# Patient Record
Sex: MALE | Race: WHITE | Employment: OTHER | URBAN - METROPOLITAN AREA
[De-identification: names, ages, dates, MRNs, and addresses within clinical notes are randomized per-mention and may not be internally consistent; named-entity substitution may affect disease eponyms.]

---

## 2017-06-13 ENCOUNTER — OFFICE VISIT (OUTPATIENT)
Dept: FAMILY MEDICINE CLINIC | Age: 57
End: 2017-06-13

## 2017-06-13 VITALS
SYSTOLIC BLOOD PRESSURE: 142 MMHG | RESPIRATION RATE: 16 BRPM | DIASTOLIC BLOOD PRESSURE: 78 MMHG | HEART RATE: 64 BPM | BODY MASS INDEX: 23.82 KG/M2 | TEMPERATURE: 98.3 F | HEIGHT: 74 IN | WEIGHT: 185.6 LBS

## 2017-06-13 DIAGNOSIS — R31.29 MICROSCOPIC HEMATURIA: ICD-10-CM

## 2017-06-13 DIAGNOSIS — N23 RENAL COLIC ON LEFT SIDE: Primary | ICD-10-CM

## 2017-06-13 DIAGNOSIS — R10.9 FLANK PAIN: ICD-10-CM

## 2017-06-13 LAB
BILIRUBIN, POC: ABNORMAL
BLOOD URINE, POC: ABNORMAL
CLARITY, POC: CLEAR
COLOR, POC: ABNORMAL
GLUCOSE URINE, POC: ABNORMAL
KETONES, POC: ABNORMAL
LEUKOCYTE EST, POC: ABNORMAL
NITRITE, POC: ABNORMAL
PH, POC: 5.5
PROTEIN, POC: ABNORMAL
SPECIFIC GRAVITY, POC: 1.03
UROBILINOGEN, POC: 1

## 2017-06-13 PROCEDURE — 99213 OFFICE O/P EST LOW 20 MIN: CPT | Performed by: FAMILY MEDICINE

## 2017-06-13 PROCEDURE — 81003 URINALYSIS AUTO W/O SCOPE: CPT | Performed by: FAMILY MEDICINE

## 2017-06-13 RX ORDER — TAMSULOSIN HYDROCHLORIDE 0.4 MG/1
0.4 CAPSULE ORAL DAILY
Qty: 14 CAPSULE | Refills: 0 | Status: SHIPPED | OUTPATIENT
Start: 2017-06-13 | End: 2018-04-05

## 2017-06-13 RX ORDER — HYDROCODONE BITARTRATE AND ACETAMINOPHEN 5; 325 MG/1; MG/1
1 TABLET ORAL EVERY 6 HOURS PRN
Qty: 30 TABLET | Refills: 0 | Status: SHIPPED | OUTPATIENT
Start: 2017-06-13 | End: 2018-04-05

## 2017-06-13 ASSESSMENT — PATIENT HEALTH QUESTIONNAIRE - PHQ9
1. LITTLE INTEREST OR PLEASURE IN DOING THINGS: 0
SUM OF ALL RESPONSES TO PHQ9 QUESTIONS 1 & 2: 0
SUM OF ALL RESPONSES TO PHQ QUESTIONS 1-9: 0
2. FEELING DOWN, DEPRESSED OR HOPELESS: 0

## 2017-06-14 ASSESSMENT — ENCOUNTER SYMPTOMS
CONSTIPATION: 0
DIARRHEA: 0
SINUS PRESSURE: 0
RHINORRHEA: 0
NAUSEA: 0
SORE THROAT: 0
SHORTNESS OF BREATH: 0
ABDOMINAL PAIN: 0
ABDOMINAL DISTENTION: 0
EYE PAIN: 0
COUGH: 0

## 2017-06-19 ENCOUNTER — TELEPHONE (OUTPATIENT)
Dept: FAMILY MEDICINE CLINIC | Age: 57
End: 2017-06-19

## 2017-06-19 DIAGNOSIS — E78.5 HYPERLIPIDEMIA, UNSPECIFIED HYPERLIPIDEMIA TYPE: Primary | Chronic | ICD-10-CM

## 2017-06-19 DIAGNOSIS — Z12.5 PROSTATE CANCER SCREENING: ICD-10-CM

## 2017-07-15 LAB
CHOLESTEROL, TOTAL: 233 MG/DL
CHOLESTEROL/HDL RATIO: NORMAL
HDLC SERPL-MCNC: 62 MG/DL (ref 35–70)
LDL CHOLESTEROL CALCULATED: 152 MG/DL (ref 0–160)
PROSTATE SPECIFIC ANTIGEN: 0.6 NG/ML
TRIGL SERPL-MCNC: 93 MG/DL
VLDLC SERPL CALC-MCNC: 19 MG/DL

## 2017-07-31 ENCOUNTER — TELEPHONE (OUTPATIENT)
Dept: FAMILY MEDICINE CLINIC | Age: 57
End: 2017-07-31

## 2017-07-31 DIAGNOSIS — E78.5 HYPERLIPIDEMIA, UNSPECIFIED HYPERLIPIDEMIA TYPE: Primary | Chronic | ICD-10-CM

## 2017-08-29 ENCOUNTER — NURSE TRIAGE (OUTPATIENT)
Dept: ADMINISTRATIVE | Age: 57
End: 2017-08-29

## 2017-10-11 LAB
ALBUMIN SERPL-MCNC: 3.7 G/DL
ALP BLD-CCNC: 63 U/L
ALT SERPL-CCNC: 26 U/L
ANION GAP SERPL CALCULATED.3IONS-SCNC: 5 MMOL/L
AST SERPL-CCNC: 15 U/L
AVERAGE GLUCOSE: NORMAL
BILIRUB SERPL-MCNC: 0.8 MG/DL (ref 0.1–1.4)
BUN BLDV-MCNC: 12 MG/DL
CALCIUM SERPL-MCNC: 8.9 MG/DL
CHLORIDE BLD-SCNC: 104 MMOL/L
CHOLESTEROL, TOTAL: 203 MG/DL
CHOLESTEROL/HDL RATIO: NORMAL
CO2: 29 MMOL/L
CREAT SERPL-MCNC: 0.9 MG/DL
GFR CALCULATED: NORMAL
GLUCOSE BLD-MCNC: 83 MG/DL
HBA1C MFR BLD: 5.8 %
HDLC SERPL-MCNC: 61 MG/DL (ref 35–70)
LDL CHOLESTEROL CALCULATED: 122 MG/DL (ref 0–160)
POTASSIUM SERPL-SCNC: 4.6 MMOL/L
SODIUM BLD-SCNC: 138 MMOL/L
TOTAL PROTEIN: 7.1
TRIGL SERPL-MCNC: 102 MG/DL
VLDLC SERPL CALC-MCNC: NORMAL MG/DL

## 2017-11-10 LAB
CHOLESTEROL, TOTAL: 196 MG/DL
CHOLESTEROL/HDL RATIO: NORMAL
HDLC SERPL-MCNC: 59 MG/DL (ref 35–70)
LDL CHOLESTEROL CALCULATED: 118 MG/DL (ref 0–160)
PSA, ULTRASENSITIVE: 0.5
TRIGL SERPL-MCNC: 88 MG/DL
VLDLC SERPL CALC-MCNC: NORMAL MG/DL

## 2018-02-04 LAB
ALBUMIN SERPL-MCNC: NORMAL G/DL
ALP BLD-CCNC: NORMAL U/L
ALT SERPL-CCNC: NORMAL U/L
ANION GAP SERPL CALCULATED.3IONS-SCNC: 9 MMOL/L
AST SERPL-CCNC: NORMAL U/L
BASOPHILS ABSOLUTE: 0.03 /ΜL
BASOPHILS RELATIVE PERCENT: 0.5 %
BILIRUB SERPL-MCNC: NORMAL MG/DL (ref 0.1–1.4)
BILIRUBIN URINE: NORMAL MG/DL
BLOOD, URINE: NORMAL
BUN BLDV-MCNC: 14 MG/DL
CALCIUM SERPL-MCNC: 8.9 MG/DL
CHLORIDE BLD-SCNC: 107 MMOL/L
CLARITY: CLEAR
CO2: 23 MMOL/L
COLOR: YELLOW
CREAT SERPL-MCNC: 0.9 MG/DL
EOSINOPHILS ABSOLUTE: 0.08 /ΜL
EOSINOPHILS RELATIVE PERCENT: 1.5 %
GFR CALCULATED: NORMAL
GLUCOSE BLD-MCNC: 111 MG/DL
GLUCOSE URINE: NEGATIVE
HCT VFR BLD CALC: 42.9 % (ref 41–53)
HEMOGLOBIN: 14.2 G/DL (ref 13.5–17.5)
KETONES, URINE: NEGATIVE
LEUKOCYTE ESTERASE, URINE: NORMAL
LYMPHOCYTES ABSOLUTE: 1.07 /ΜL
LYMPHOCYTES RELATIVE PERCENT: 19.6 %
MCH RBC QN AUTO: 27.5 PG
MCHC RBC AUTO-ENTMCNC: 33.1 G/DL
MCV RBC AUTO: 83 FL
MONOCYTES ABSOLUTE: 0.52 /ΜL
MONOCYTES RELATIVE PERCENT: 9.5 %
NEUTROPHILS ABSOLUTE: NORMAL /ΜL
NEUTROPHILS RELATIVE PERCENT: NORMAL %
NITRITE, URINE: NEGATIVE
PDW BLD-RTO: 13.4 %
PH UA: 5 (ref 4.5–8)
PLATELET # BLD: 302 K/ΜL
PMV BLD AUTO: 9.9 FL
POTASSIUM SERPL-SCNC: 4.2 MMOL/L
PROTEIN UA: NEGATIVE
RBC # BLD: 5.17 10^6/ΜL
SODIUM BLD-SCNC: 139 MMOL/L
SPECIFIC GRAVITY UA: 1.02 (ref 1–1.03)
TOTAL PROTEIN: NORMAL
UROBILINOGEN, URINE: NORMAL
WBC # BLD: 5.47 10^3/ML

## 2018-02-12 DIAGNOSIS — N20.0 KIDNEY STONE: Primary | ICD-10-CM

## 2018-02-12 NOTE — PROGRESS NOTES
Pt has appt sat 2-17-18 for gross hematuria. Hx of smoking. Pt brought in a passed stone today. Stone sent for anaclisis but left appt on due to history of smoking.

## 2018-02-17 ENCOUNTER — OFFICE VISIT (OUTPATIENT)
Dept: UROLOGY | Age: 58
End: 2018-02-17
Payer: OTHER GOVERNMENT

## 2018-02-17 ENCOUNTER — TELEPHONE (OUTPATIENT)
Dept: UROLOGY | Age: 58
End: 2018-02-17

## 2018-02-17 VITALS
HEIGHT: 74 IN | BODY MASS INDEX: 25.27 KG/M2 | DIASTOLIC BLOOD PRESSURE: 82 MMHG | SYSTOLIC BLOOD PRESSURE: 118 MMHG | WEIGHT: 196.9 LBS

## 2018-02-17 DIAGNOSIS — R31.0 GROSS HEMATURIA: Primary | ICD-10-CM

## 2018-02-17 LAB
BILIRUBIN URINE: NEGATIVE
BLOOD URINE, POC: NEGATIVE
CHARACTER, URINE: CLEAR
COLOR, URINE: YELLOW
GLUCOSE URINE: NEGATIVE MG/DL
KETONES, URINE: NEGATIVE
LEUKOCYTE CLUMPS, URINE: NEGATIVE
NITRITE, URINE: NEGATIVE
PH, URINE: 7
POST VOID RESIDUAL (PVR): NORMAL ML
PROTEIN, URINE: NEGATIVE MG/DL
SPECIFIC GRAVITY, URINE: 1.01 (ref 1–1.03)
UROBILINOGEN, URINE: 0.2 EU/DL

## 2018-02-17 PROCEDURE — 51798 US URINE CAPACITY MEASURE: CPT | Performed by: UROLOGY

## 2018-02-17 PROCEDURE — 99214 OFFICE O/P EST MOD 30 MIN: CPT | Performed by: UROLOGY

## 2018-02-17 PROCEDURE — 81003 URINALYSIS AUTO W/O SCOPE: CPT | Performed by: UROLOGY

## 2018-02-17 PROCEDURE — 52000 CYSTOURETHROSCOPY: CPT | Performed by: UROLOGY

## 2018-02-17 ASSESSMENT — ENCOUNTER SYMPTOMS
CHEST TIGHTNESS: 0
CONSTIPATION: 0
DIARRHEA: 0
SHORTNESS OF BREATH: 0

## 2018-02-22 ENCOUNTER — TELEPHONE (OUTPATIENT)
Dept: UROLOGY | Age: 58
End: 2018-02-22

## 2018-02-26 LAB — STONE ANALYSIS: NORMAL

## 2018-03-13 ENCOUNTER — TELEPHONE (OUTPATIENT)
Dept: UROLOGY | Age: 58
End: 2018-03-13

## 2018-04-05 ENCOUNTER — OFFICE VISIT (OUTPATIENT)
Dept: UROLOGY | Age: 58
End: 2018-04-05
Payer: COMMERCIAL

## 2018-04-05 ENCOUNTER — TELEPHONE (OUTPATIENT)
Dept: UROLOGY | Age: 58
End: 2018-04-05

## 2018-04-05 VITALS
SYSTOLIC BLOOD PRESSURE: 122 MMHG | HEIGHT: 73 IN | WEIGHT: 201 LBS | DIASTOLIC BLOOD PRESSURE: 78 MMHG | BODY MASS INDEX: 26.64 KG/M2

## 2018-04-05 DIAGNOSIS — N20.0 NEPHROLITHIASIS: ICD-10-CM

## 2018-04-05 DIAGNOSIS — R39.198 DIFFICULTY URINATING: ICD-10-CM

## 2018-04-05 DIAGNOSIS — R31.9 HEMATURIA, UNSPECIFIED TYPE: Primary | ICD-10-CM

## 2018-04-05 DIAGNOSIS — N20.0 KIDNEY STONE: ICD-10-CM

## 2018-04-05 LAB
BILIRUBIN URINE: NEGATIVE
BLOOD URINE, POC: NEGATIVE
CHARACTER, URINE: CLEAR
COLOR, URINE: YELLOW
GLUCOSE URINE: NEGATIVE MG/DL
KETONES, URINE: NEGATIVE
LEUKOCYTE CLUMPS, URINE: NEGATIVE
NITRITE, URINE: NEGATIVE
PH, URINE: 6
PROTEIN, URINE: NEGATIVE MG/DL
SPECIFIC GRAVITY, URINE: >= 1.03 (ref 1–1.03)
UROBILINOGEN, URINE: 0.2 EU/DL

## 2018-04-05 PROCEDURE — 1036F TOBACCO NON-USER: CPT | Performed by: PHYSICIAN ASSISTANT

## 2018-04-05 PROCEDURE — 81003 URINALYSIS AUTO W/O SCOPE: CPT | Performed by: PHYSICIAN ASSISTANT

## 2018-04-05 PROCEDURE — G8427 DOCREV CUR MEDS BY ELIG CLIN: HCPCS | Performed by: PHYSICIAN ASSISTANT

## 2018-04-05 PROCEDURE — 99213 OFFICE O/P EST LOW 20 MIN: CPT | Performed by: PHYSICIAN ASSISTANT

## 2018-04-05 PROCEDURE — 3017F COLORECTAL CA SCREEN DOC REV: CPT | Performed by: PHYSICIAN ASSISTANT

## 2018-04-05 PROCEDURE — G8419 CALC BMI OUT NRM PARAM NOF/U: HCPCS | Performed by: PHYSICIAN ASSISTANT

## 2018-04-08 ASSESSMENT — LIFESTYLE VARIABLES: TOBACCO_USE: 1

## 2018-07-13 ENCOUNTER — HOSPITAL ENCOUNTER (EMERGENCY)
Age: 58
Discharge: HOME | End: 2018-07-13
Payer: COMMERCIAL

## 2018-07-13 VITALS
RESPIRATION RATE: 18 BRPM | BODY MASS INDEX: 24.45 KG/M2 | HEIGHT: 74 IN | WEIGHT: 190.5 LBS | DIASTOLIC BLOOD PRESSURE: 70 MMHG | SYSTOLIC BLOOD PRESSURE: 115 MMHG | TEMPERATURE: 98 F | OXYGEN SATURATION: 98 % | HEART RATE: 81 BPM

## 2018-07-13 DIAGNOSIS — S61.211A LACERATION OF LEFT INDEX FINGER WITHOUT FOREIGN BODY WITHOUT DAMAGE TO NAIL, INITIAL ENCOUNTER: Primary | ICD-10-CM

## 2018-07-13 PROCEDURE — 99214 OFFICE O/P EST MOD 30 MIN: CPT | Performed by: NURSE PRACTITIONER

## 2018-07-13 PROCEDURE — 12001 RPR S/N/AX/GEN/TRNK 2.5CM/<: CPT

## 2018-07-13 PROCEDURE — 99212 OFFICE O/P EST SF 10 MIN: CPT

## 2018-07-13 PROCEDURE — 12001 RPR S/N/AX/GEN/TRNK 2.5CM/<: CPT | Performed by: NURSE PRACTITIONER

## 2018-07-13 ASSESSMENT — ENCOUNTER SYMPTOMS
VOMITING: 0
DIARRHEA: 0
NAUSEA: 0
COUGH: 0
SHORTNESS OF BREATH: 0

## 2018-07-13 NOTE — ED TRIAGE NOTES
Pt to room 2 with c/o laceration to left index finger. Pt states was working in garage with a knife and accidentally cut self. Pt denies any pain at current.

## 2018-07-13 NOTE — ED NOTES
An After Visit Summary was printed, reviewed with pt and given to.   Left index finger dressed with dry gauze and wrapped with 1 inch coban prior to discharge,      Pj Carey LPN  40/09/11 [id number]

## 2018-07-13 NOTE — ED PROVIDER NOTES
use drugs. PHYSICAL EXAM     ED TRIAGE VITALS  BP: 115/70, Temp: 98 °F (36.7 °C), Pulse: 81, Resp: 18, SpO2: 98 %,Estimated body mass index is 24.46 kg/m² as calculated from the following:    Height as of this encounter: 6' 2\" (1.88 m). Weight as of this encounter: 190 lb 8 oz (86.4 kg). ,No LMP for male patient. Physical Exam   Constitutional: He is oriented to person, place, and time. He appears well-developed and well-nourished. He is cooperative. No distress. HENT:   Head: Normocephalic and atraumatic. Pulmonary/Chest: Effort normal. No respiratory distress. Musculoskeletal:        Left hand: He exhibits laceration. Hands:  Neurological: He is alert and oriented to person, place, and time. Skin: Skin is warm and dry. Psychiatric: He has a normal mood and affect. His speech is normal and behavior is normal. Thought content normal.   Nursing note and vitals reviewed. DIAGNOSTIC RESULTS   Labs:No results found for this visit on 07/13/18. IMAGING:    No orders to display         URGENT CARE COURSE:     Vitals:    07/13/18 1443 07/13/18 1447   BP:  115/70   Pulse:  81   Resp:  18   Temp:  98 °F (36.7 °C)   TempSrc:  Temporal   SpO2:  98%   Weight: 190 lb 8 oz (86.4 kg)    Height: 6' 2\" (1.88 m)        Medications - No data to display         PROCEDURES:  Laceration repair:  Left distal index finger laceration on the dorsal surface measuring 1 cm. Wound edges are clean. Wound cleansed with wound cleanser and Betadine. Wound anesthetized with approximately one mL of 1% lidocaine. Wound closed with 3 interrupted sutures using 5.0 Ethicon sutures. Wound cleansed after repair with wound wash. Dressing applied per nursing. Patient tolerated well. FINAL IMPRESSION      1.  Laceration of left index finger without foreign body without damage to nail, initial encounter          DISPOSITION/PLAN   DISPOSITION Decision To Discharge 07/13/2018 03:25:09 PM  Monitor incision for redness, drainage, pain, or fever. Keep clean and dry. May wash gently with soap and water after 24 hours. Sutures need to be removed in 7-10 days. You can make an appointment with your family doctor or return to the urgent care center for removal  Tylenol or Motrin for pain. Follow up with your PCP or return for any concerns   or go to the Emergency Department  Patient with a laceration to the left index finger to the pad distally. Wound repaired as described above. No antibiotics prescribed. Further instructions outlined above. All the patient's questions answered. The patient was discharged from the [facility] in good condition.     PATIENT REFERRED TO:  Obinna Jc MD  [address] [place] [address]  [phone]    In 1 week  For suture removal      DISCHARGE MEDICATIONS:  Current Discharge Medication List          Current Discharge Medication List          Current Discharge Medication List          Ophelia Bumpers Case, APRN - CNP    (Please note that portions of this note were completed with a voice recognition program.  Efforts were made to edit the dictations but occasionally words are mis-transcribed.)         Ophelia Bumpers Case, APRN - CNP  07/13/18 [id number]

## 2018-07-19 ENCOUNTER — HOSPITAL ENCOUNTER (EMERGENCY)
Age: 58
Discharge: HOME | End: 2018-07-19
Payer: COMMERCIAL

## 2018-07-19 VITALS
SYSTOLIC BLOOD PRESSURE: 121 MMHG | RESPIRATION RATE: 16 BRPM | OXYGEN SATURATION: 98 % | HEART RATE: 65 BPM | DIASTOLIC BLOOD PRESSURE: 67 MMHG | TEMPERATURE: 98.1 F

## 2018-07-19 DIAGNOSIS — Z48.02 ENCOUNTER FOR REMOVAL OF SUTURES: Primary | ICD-10-CM

## 2018-07-19 PROCEDURE — 2709999900 HC NON-CHARGEABLE SUPPLY

## 2018-07-19 PROCEDURE — 99024 POSTOP FOLLOW-UP VISIT: CPT | Performed by: NURSE PRACTITIONER

## 2018-07-19 PROCEDURE — 99211 OFF/OP EST MAY X REQ PHY/QHP: CPT

## 2018-07-19 ASSESSMENT — ENCOUNTER SYMPTOMS
VOMITING: 0
WHEEZING: 0
ABDOMINAL PAIN: 0
CHEST TIGHTNESS: 0
SHORTNESS OF BREATH: 0
NAUSEA: 0

## 2018-07-19 NOTE — ED TRIAGE NOTES
PT arrives ambulatory by self  to room with complaint of SUTURE REMOVAL symptoms started 7 days ago.

## 2018-07-19 NOTE — ED PROVIDER NOTES
[id number]  Urgent Care Encounter       CHIEF COMPLAINT       Chief Complaint   Patient presents with    Suture / Staple Removal       Nurses Notes reviewed and I agree except as noted in the HPI. HISTORY OF PRESENT ILLNESS   Jennifer Ordaz is a 61 y.o. male who presents For suture removal.  Patient reports that 6 days ago he was using a utility knife when he made a cut on his left index finger. He denies any issue such as fever, chills, discharge, redness or bleeding from the wound site. He does report that one of the sutures did untie and has loosened, however he had no other issues. The history is provided by the patient. REVIEW OF SYSTEMS     Review of Systems   Constitutional: Negative for chills and fatigue. Respiratory: Negative for chest tightness, shortness of breath and wheezing. Cardiovascular: Negative for chest pain. Gastrointestinal: Negative for abdominal pain, nausea and vomiting. Skin: Positive for wound. PAST MEDICAL HISTORY         Diagnosis Date    Dog bite of calf 3/10    right calf    Hyperlipidemia     Renal calculi     Umbilical hernia        SURGICAL HISTORY     Patient  has a past surgical history that includes back surgery (08/17/2015) and hernia repair (9535-1313). CURRENT MEDICATIONS       Discharge Medication List as of 7/19/2018  8:42 AM      CONTINUE these medications which have NOT CHANGED    Details   Multiple Vitamin (MULTI-VITAMIN DAILY PO) Take by mouthHistorical Med             ALLERGIES     Patient is has No Known Allergies. Patients   Immunization History   Administered Date(s) Administered    Tdap (Boostrix, Adacel) 01/01/2005       FAMILY HISTORY     Patient's family history includes Cancer in his father and mother. SOCIAL HISTORY     Patient  reports that he quit smoking about 26 years ago. He has quit using smokeless tobacco. He reports that he drinks alcohol.  He reports that he does not use

## 2018-09-04 ENCOUNTER — HOSPITAL ENCOUNTER (OUTPATIENT)
Dept: MRI IMAGING | Age: 58
Discharge: HOME | End: 2018-09-04
Payer: COMMERCIAL

## 2018-09-04 DIAGNOSIS — M17.12 ARTHRITIS OF KNEE, LEFT: ICD-10-CM

## 2018-09-04 PROCEDURE — 73721 MRI JNT OF LWR EXTRE W/O DYE: CPT

## 2018-10-18 ENCOUNTER — HOSPITAL ENCOUNTER (OUTPATIENT)
Dept: CT IMAGING | Age: 58
Discharge: HOME | End: 2018-10-18
Payer: COMMERCIAL

## 2018-10-18 DIAGNOSIS — M54.16 LUMBAR RADICULOPATHY: ICD-10-CM

## 2018-10-18 DIAGNOSIS — M48.061 SPINAL STENOSIS, LUMBAR REGION, WITHOUT NEUROGENIC CLAUDICATION: ICD-10-CM

## 2018-10-18 PROCEDURE — 72131 CT LUMBAR SPINE W/O DYE: CPT

## 2018-10-22 ENCOUNTER — HOSPITAL ENCOUNTER (OUTPATIENT)
Age: 58
Discharge: HOME | End: 2018-10-22
Payer: COMMERCIAL

## 2018-10-22 LAB
ANION GAP SERPL CALCULATED.3IONS-SCNC: 13 MEQ/L (ref 8–16)
APTT: 34.5 SECONDS (ref 22–38)
BASOPHILS # BLD: 0.9 %
BASOPHILS ABSOLUTE: 0 THOU/MM3 (ref 0–0.1)
BILIRUBIN URINE: NEGATIVE
BLOOD, URINE: NEGATIVE
BUN BLDV-MCNC: 13 MG/DL (ref 7–22)
CHARACTER, URINE: CLEAR
CHLORIDE BLD-SCNC: 102 MEQ/L (ref 98–111)
CO2: 26 MEQ/L (ref 23–33)
COLOR: YELLOW
CREAT SERPL-MCNC: 0.9 MG/DL (ref 0.4–1.2)
EKG ATRIAL RATE: 70 BPM
EKG P AXIS: 73 DEGREES
EKG P-R INTERVAL: 156 MS
EKG Q-T INTERVAL: 386 MS
EKG QRS DURATION: 94 MS
EKG QTC CALCULATION (BAZETT): 416 MS
EKG R AXIS: 61 DEGREES
EKG T AXIS: 38 DEGREES
EKG VENTRICULAR RATE: 70 BPM
EOSINOPHIL # BLD: 1.3 %
EOSINOPHILS ABSOLUTE: 0.1 THOU/MM3 (ref 0–0.4)
ERYTHROCYTE [DISTWIDTH] IN BLOOD BY AUTOMATED COUNT: 13 % (ref 11.5–14.5)
ERYTHROCYTE [DISTWIDTH] IN BLOOD BY AUTOMATED COUNT: 39.8 FL (ref 35–45)
GFR SERPL CREATININE-BSD FRML MDRD: 86 ML/MIN/1.73M2
GLUCOSE BLD-MCNC: 103 MG/DL (ref 70–108)
GLUCOSE URINE: NEGATIVE MG/DL
HCT VFR BLD CALC: 42 % (ref 42–52)
HEMOGLOBIN: 14 GM/DL (ref 14–18)
IMMATURE GRANS (ABS): 0.01 THOU/MM3 (ref 0–0.07)
IMMATURE GRANULOCYTES: 0.2 %
INR BLD: 0.95 (ref 0.85–1.13)
KETONES, URINE: ABNORMAL
LEUKOCYTE ESTERASE, URINE: NEGATIVE
LYMPHOCYTES # BLD: 24.2 %
LYMPHOCYTES ABSOLUTE: 1.1 THOU/MM3 (ref 1–4.8)
MCH RBC QN AUTO: 28 PG (ref 26–33)
MCHC RBC AUTO-ENTMCNC: 33.3 GM/DL (ref 32.2–35.5)
MCV RBC AUTO: 84 FL (ref 80–94)
MONOCYTES # BLD: 8.9 %
MONOCYTES ABSOLUTE: 0.4 THOU/MM3 (ref 0.4–1.3)
NITRITE, URINE: NEGATIVE
NUCLEATED RED BLOOD CELLS: 0 /100 WBC
PH UA: 6
PLATELET # BLD: 321 THOU/MM3 (ref 130–400)
PMV BLD AUTO: 10 FL (ref 9.4–12.4)
POTASSIUM SERPL-SCNC: 4.2 MEQ/L (ref 3.5–5.2)
PROTEIN UA: NEGATIVE
RBC # BLD: 5 MILL/MM3 (ref 4.7–6.1)
SEG NEUTROPHILS: 64.5 %
SEGMENTED NEUTROPHILS ABSOLUTE COUNT: 2.9 THOU/MM3 (ref 1.8–7.7)
SODIUM BLD-SCNC: 141 MEQ/L (ref 135–145)
SPECIFIC GRAVITY, URINE: 1.02 (ref 1–1.03)
UROBILINOGEN, URINE: 1 EU/DL
WBC # BLD: 4.5 THOU/MM3 (ref 4.8–10.8)

## 2018-10-22 PROCEDURE — 80051 ELECTROLYTE PANEL: CPT

## 2018-10-22 PROCEDURE — 93010 ELECTROCARDIOGRAM REPORT: CPT | Performed by: INTERNAL MEDICINE

## 2018-10-22 PROCEDURE — 84520 ASSAY OF UREA NITROGEN: CPT

## 2018-10-22 PROCEDURE — 85730 THROMBOPLASTIN TIME PARTIAL: CPT

## 2018-10-22 PROCEDURE — 85610 PROTHROMBIN TIME: CPT

## 2018-10-22 PROCEDURE — 82565 ASSAY OF CREATININE: CPT

## 2018-10-22 PROCEDURE — 36415 COLL VENOUS BLD VENIPUNCTURE: CPT

## 2018-10-22 PROCEDURE — 81003 URINALYSIS AUTO W/O SCOPE: CPT

## 2018-10-22 PROCEDURE — 82947 ASSAY GLUCOSE BLOOD QUANT: CPT

## 2018-10-22 PROCEDURE — 85025 COMPLETE CBC W/AUTO DIFF WBC: CPT

## 2018-10-22 PROCEDURE — 93005 ELECTROCARDIOGRAM TRACING: CPT | Performed by: ORTHOPAEDIC SURGERY

## 2018-10-29 ENCOUNTER — OFFICE VISIT (OUTPATIENT)
Dept: FAMILY MEDICINE CLINIC | Age: 58
End: 2018-10-29
Payer: COMMERCIAL

## 2018-10-29 VITALS
HEIGHT: 75 IN | SYSTOLIC BLOOD PRESSURE: 110 MMHG | RESPIRATION RATE: 16 BRPM | HEART RATE: 63 BPM | OXYGEN SATURATION: 99 % | TEMPERATURE: 98.4 F | WEIGHT: 192 LBS | BODY MASS INDEX: 23.87 KG/M2 | DIASTOLIC BLOOD PRESSURE: 70 MMHG

## 2018-10-29 DIAGNOSIS — Z01.818 PREOP EXAMINATION: ICD-10-CM

## 2018-10-29 DIAGNOSIS — M17.12 PRIMARY OSTEOARTHRITIS OF LEFT KNEE: Primary | ICD-10-CM

## 2018-10-29 DIAGNOSIS — E78.5 HYPERLIPIDEMIA, UNSPECIFIED HYPERLIPIDEMIA TYPE: Chronic | ICD-10-CM

## 2018-10-29 PROCEDURE — 99242 OFF/OP CONSLTJ NEW/EST SF 20: CPT | Performed by: FAMILY MEDICINE

## 2018-10-29 PROCEDURE — 3017F COLORECTAL CA SCREEN DOC REV: CPT | Performed by: FAMILY MEDICINE

## 2018-10-29 PROCEDURE — G8420 CALC BMI NORM PARAMETERS: HCPCS | Performed by: FAMILY MEDICINE

## 2018-10-29 PROCEDURE — G8484 FLU IMMUNIZE NO ADMIN: HCPCS | Performed by: FAMILY MEDICINE

## 2018-10-29 PROCEDURE — G8427 DOCREV CUR MEDS BY ELIG CLIN: HCPCS | Performed by: FAMILY MEDICINE

## 2018-10-29 ASSESSMENT — ENCOUNTER SYMPTOMS
BACK PAIN: 1
SHORTNESS OF BREATH: 0
ABDOMINAL PAIN: 0
RHINORRHEA: 0
WHEEZING: 0
DIARRHEA: 0
CONSTIPATION: 0
NAUSEA: 0
SORE THROAT: 0
COUGH: 0
VOMITING: 0

## 2018-10-29 ASSESSMENT — PATIENT HEALTH QUESTIONNAIRE - PHQ9
SUM OF ALL RESPONSES TO PHQ QUESTIONS 1-9: 0
1. LITTLE INTEREST OR PLEASURE IN DOING THINGS: 0
SUM OF ALL RESPONSES TO PHQ9 QUESTIONS 1 & 2: 0
SUM OF ALL RESPONSES TO PHQ QUESTIONS 1-9: 0
2. FEELING DOWN, DEPRESSED OR HOPELESS: 0

## 2018-11-08 ENCOUNTER — HOSPITAL ENCOUNTER (OUTPATIENT)
Dept: MRI IMAGING | Age: 58
Discharge: HOME | End: 2018-11-08
Payer: COMMERCIAL

## 2018-11-08 DIAGNOSIS — M54.16 LUMBAR RADICULOPATHY: ICD-10-CM

## 2018-11-08 PROCEDURE — A9579 GAD-BASE MR CONTRAST NOS,1ML: HCPCS | Performed by: ORTHOPAEDIC SURGERY

## 2018-11-08 PROCEDURE — 6360000004 HC RX CONTRAST MEDICATION: Performed by: ORTHOPAEDIC SURGERY

## 2018-11-08 PROCEDURE — 72158 MRI LUMBAR SPINE W/O & W/DYE: CPT

## 2018-11-08 RX ADMIN — GADOTERIDOL 15 ML: 279.3 INJECTION, SOLUTION INTRAVENOUS at 14:55

## 2019-05-23 ENCOUNTER — HOSPITAL ENCOUNTER (OUTPATIENT)
Dept: PHYSICAL THERAPY | Age: 59
Setting detail: THERAPIES SERIES
Discharge: HOME | End: 2019-05-23
Payer: COMMERCIAL

## 2019-05-23 PROCEDURE — 97110 THERAPEUTIC EXERCISES: CPT

## 2019-05-23 PROCEDURE — 97161 PT EVAL LOW COMPLEX 20 MIN: CPT

## 2019-05-23 NOTE — FLOWSHEET NOTE
** PLEASE SIGN, DATE AND TIME CERTIFICATION BELOW AND RETURN TO [facility] OUTPATIENT REH[facility]ILITATION (FAX #: [phone]). ATTEST/CO-SIGN IF ACCESSING VIA [facility]. THANK YOU.**    I certify that I have examined the patient below and determined that Physical Medicine and Rehabilitation service is necessary and that I approve the established plan of care for up to 90 days or as specifically noted. Attestation, signature or co-signature of physician indicates approval of certification requirements.    ________________________ ____________ __________  Physician Signature   Date   Time       [place]     Time In: 1710  Time Out: 1450  Minutes: 65  Timed Code Treatment Minutes: 10 Minutes(Ther EX)           Oswestry 15/45 = 33% impairment    Date: 2019  Patient Name: Veena Osuna,  Gender:  male        CSN: [id number]   : 1960  (60 y.o.)  Referral Date : 19     Referring Practitioner: Donal Jensen DO      Diagnosis: M54.16 (ICD-10-CM) - Radiculopathy, lumbar region  Treatment Diagnosis: Lumbago, spinal stiffness, B hip stiffness, muscular weakness  Additional Pertinent Hx: Lumbar fusion L4/L5 , and again 2019 due to loose hardware. General:  PT Visit Information  Onset Date: 19  PT [facility] Information[facility] - APPROVED FOR A TOTAL OF 18 VISITS FROM 19 TO 19, 3 TIMES A WEEK FOR 6 WEEKS. Modalities covered, no aquatic, no ionto  Total # of Visits Approved: 18  Total # of Visits to Date: 1  Plan of Care/Certification Expiration Date: 19  Progress Note Counter: 1/10 for PN       See Medical History Questionnaire for information related to allergies and medications. Subjective:  Chart Reviewed: Yes  Comments: Physician follow up:  Dr. Valeriy Epperson      Subjective: Patient had lumbar surgery in 2019, L4/L5 fusion.  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 Patient denies pain, just stiff. Returned to work last week partial days, 10 lb weight restriction, no sustained positions, no bending, lifting. Denies leg pain today, history of L leg burning  pain and sciatica, no longer an issue. He reports numbness in L thigh only when wearing lumbar support brace. Wears his brace at work, also has C9 coverage for a more elastic type brace. Pain:  Patient Currently in Pain: No        Social/Functional History:    Lives With: Spouse  Type of Home: House  Home Layout: Multi-level  Home Equipment: Rolling walker, Cane          Receives Help From: Family  ADL Assistance: Independent  Homemaking Assistance: Independent  Homemaking Responsibilities: Yes  Ambulation Assistance: Independent  Transfer Assistance: Independent    Active : Yes  Occupation: Full time employment  Type of occupation: [employer] - . OfferLoungelift operation, control room setting, laboratory setting. Has not yet returned to his normal work. Long durations sitting, also may need to be on his feet and change positions. Leisure & Hobbies: Walking for exercise, about a mile.      Objective    Observation/Palpation  Palpation: Midline lumbar pain very mild  Scar: Lumbar incision healing well      RLE General PROM: R knee to chest 0-100 deg, R hamstring 0-70 deg, piriformis tight    LLE General PROM: R knee to chest 0-100 deg, R hamstring 0-60 deg, piriformis tight    Lumbar: flexion 0-40 deg, extension 0-20 deg, sidebend equal bilateral     Strength LLE: Exception  Comment: L hip abduction 4+/5, bridge 4+/5     Overall Sensation Status: WNL            Ambulation 1  Surface: carpet  Device: No Device  Assistance: Independent  Quality of Gait: Normalized gait, no antalgia noted  Distance: 200 ft about clinic      Stairs  # Steps : 4  Rails: None  Assistance: Independent        Balance  Single Leg Stance R Le  Single Leg Stance L from advice on lifting sitting and exercise. 21%-40%: moderate disability The patient experiences more pain and difficulty with sitting, lifting and standing. Travel and social life are more difficult and they may be disabled from work. Personal care, sexual activity and sleeping are not grossly affected and the patient can usually be managed by conservative means. 41%-60%: severe disability Pain remains themain problem with this group but activities of daily living are affected. These patient require a detailed investigation. 61%-80%: crippled Back pain impinges on all aspects of the patient's life. Positive intervention is required. 81%-100%: These patients are either bed-bound or exaggerating their symptoms. AMARI EspinalT and Jas ROLON (2000)  The Oswestry Disability Index. Spine, 30(00):[phone]. Activity Tolerance:  Activity Tolerance: Patient Tolerated treatment well    Assessment: Body structures, Functions, Activity limitations: Decreased ROM, Decreased strength, Decreased balance, Increased Pain, Decreased high-level IADLs  Assessment: Patient presents with muscular tightness and balance deficits, LLE weakness following lumbar fusion surgery in Feb 2019. He would benefit from physical therapy to decrease pain, improve flexibility and increase core strength with abdominal bracing and hip strength so that he can return to prior level working full time and sitting long durations at work. Prognosis: Excellent       Patient Education:  Patient Education: Reviewed orthoneuro packet with patient, demonstrates understanding. Plan:  Times per week: 3  Plan weeks: 8  Specific instructions for Next Treatment: No excessive double knee to chest, lumbar fusion. No lifting >10 lbs, no trunk rotation.  Review exercises from HEP packet, Hip flexibility (hip flexor supine off edge, piriformis, hamstring), Core abdominal bracing, leg raises x3, bridges, Nautilus equipment  Current Treatment Recommendations: Strengthening, ROM, Balance Training, Stair training, Gait Training, Manual Therapy - Joint Manipulation, Manual Therapy - Soft Tissue Mobilization, Modalities, Home Exercise Program    History: Personal factors or comorbidities that impact plan of care - Low Complexity: no personal factors or comorbidities    Examination: Body structures and functions, activity limitations, participation restrictions; using standardized tests and measures - Low Complexity: 1-2 body structures and functional, activity limitation and/or participation restrictions. See restrictions and objective section above for details. Clinical Presentation: Low - Stable and Uncomplicated: good mobility and strength, mild deficits    Decision Making: Low Complexity due to see above. Decision making was based on patient assessment and decision making process in determining plan of care and establishing reasonable expectations for measurable functional outcomes. Evaluation Complexity: Based on the findings of patient history, examination, clinical presentation, and decision making during this evaluation, the evaluation of April Campbell  is of low complexity. 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 Short term goal 2: Patient will increase BLE flexibility to 0-90 deg hamstring, and piriformis leg crossed knee to chest 0-120 deg, hip flexor 0-15 deg extension off edge in order to decrease strain on lumbar spine with bending. Short term goal 3: Patient will demonstrate good abdominal bracing with SLR x3 directions in order to stabilize lumbar spine with transfers and lifting. Short term goal 4: Patient will be IND with HEP in order to meet long term goals.     Long term goals  Time Frame for Long term goals : 8 weeks  Long term goal 1: Patient will improve score of Oswestry LBP disability questionnaire from baseline 33% impairment to less than 15% in order to tolerate work activities. Long term goal 2: Patient will squat to lift 25# box from floor to standing height using good lifting mechanics in order to perform household tasks and lift items at work. Long term goal 3: Patient will report ability to tolerate full 12 hour shift without any onset of LLE symptoms or back pain greater than 3/10. Long term goal 4: Patient will increase BLE strength to 5/5 for hips, knees, and ankles in order to squat, lunge, and climb steps without UE support. Long term goal 5: Patient will balance in either SLS x15 sec no LOB in order to climb on and off forklift, climb stairs safely.      Damir Godinez, PT

## 2019-05-28 ENCOUNTER — HOSPITAL ENCOUNTER (OUTPATIENT)
Dept: PHYSICAL THERAPY | Age: 59
Setting detail: THERAPIES SERIES
Discharge: HOME | End: 2019-05-28
Payer: COMMERCIAL

## 2019-05-28 PROCEDURE — 97110 THERAPEUTIC EXERCISES: CPT

## 2019-05-28 NOTE — PROGRESS NOTES
high-level IADLs  Assessment: Patient demonstrates good technique with HEP, some weakness with posterior tilt exercises. Prognosis: Excellent       Patient Education:  Patient Education: Continue HEP                       Plan:  Times per week: 3  Plan weeks: 8  Specific instructions for Next Treatment: No excessive double knee to chest, lumbar fusion. No lifting >10 lbs, no trunk rotation. Review exercises from HEP packet, Hip flexibility (hip flexor supine off edge, piriformis, hamstring), Core abdominal bracing, leg raises x3, bridges, Nautilus equipment  Current Treatment Recommendations: Strengthening, ROM, Balance Training, Stair training, Gait Training, Manual Therapy - Joint Manipulation, Manual Therapy - Soft Tissue Mobilization, Modalities, Home Exercise Program    Goals:  Patient goals : Return to prior level working 12 hours     Short term goals  Time Frame for Short term goals: 4 weeks  Short term goal 1: Patient will report ability to sit one hour at work without increased stiffness 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 Short term goal 2: Patient will increase BLE flexibility to 0-90 deg hamstring, and piriformis leg crossed knee to chest 0-120 deg, hip flexor 0-15 deg extension off edge in order to decrease strain on lumbar spine with bending. Short term goal 3: Patient will demonstrate good abdominal bracing with SLR x3 directions in order to stabilize lumbar spine with transfers and lifting. Short term goal 4: Patient will be IND with HEP in order to meet long term goals. Long term goals  Time Frame for Long term goals : 8 weeks  Long term goal 1: Patient will improve score of Oswestry LBP disability questionnaire from baseline 33% impairment to less than 15% in order to tolerate work activities.    Long term goal 2: Patient will squat to lift 25# box from floor to standing height using good lifting mechanics in order to perform household tasks and lift items at work.  Niurka Matthews term goal 3: Patient will report ability to tolerate full 12 hour shift without any onset of LLE symptoms or back pain greater than 3/10. Long term goal 4: Patient will increase BLE strength to 5/5 for hips, knees, and ankles in order to squat, lunge, and climb steps without UE support. Long term goal 5: Patient will balance in either SLS x15 sec no LOB in order to climb on and off forklift, climb stairs safely.      Jeanine Singleton, PT

## 2019-05-30 ENCOUNTER — HOSPITAL ENCOUNTER (OUTPATIENT)
Dept: PHYSICAL THERAPY | Age: 59
Setting detail: THERAPIES SERIES
Discharge: HOME | End: 2019-05-30
Payer: COMMERCIAL

## 2019-05-30 PROCEDURE — 97110 THERAPEUTIC EXERCISES: CPT

## 2019-05-30 ASSESSMENT — PAIN SCALES - GENERAL: PAINLEVEL_OUTOF10: 3

## 2019-05-30 ASSESSMENT — PAIN DESCRIPTION - DESCRIPTORS: DESCRIPTORS: ACHING

## 2019-05-30 ASSESSMENT — PAIN DESCRIPTION - ORIENTATION: ORIENTATION: LEFT

## 2019-05-30 ASSESSMENT — PAIN DESCRIPTION - PAIN TYPE: TYPE: CHRONIC PAIN

## 2019-05-30 ASSESSMENT — PAIN DESCRIPTION - LOCATION: LOCATION: BACK

## 2019-05-30 NOTE — PROGRESS NOTES
[place]     Time In: 1300  Time Out: 5254  Minutes: 41  Timed Code Treatment Minutes: 41 Minutes                Date: 2019  Patient Name: Arnie Marshall,  Gender:  male        CSN: [id number]   : 1960  (60 y.o.)  Referral Date : 19    Referring Practitioner: Vivi Dumont DO      Diagnosis: M54.16 (ICD-10-CM) - Radiculopathy, lumbar region  Treatment Diagnosis: Lumbago, spinal stiffness, B hip stiffness, muscular weakness   Additional Pertinent Hx: Lumbar fusion L4/L5 , and again 2019 due to loose hardware. General:  PT Visit Information  Onset Date: 19  PT Insurance Information: [email or web address] - APPROVED FOR A TOTAL OF 18 VISITS FROM 19 TO 19, 3 TIMES A WEEK FOR 6 WEEKS. Modalities covered, no aquatic, no ionto  Total # of Visits to Date: 3  Plan of Care/Certification Expiration Date: 19  Progress Note Counter: 3/10 for PN               Subjective:  Chart Reviewed: Yes  Patient assessed for rehabilitation services?: Yes  Response To Previous Treatment: Patient with no complaints from previous session. Family / Caregiver Present: No  Comments: Physician follow up:  Dr. Ming Greene      Subjective: Pain isn't bad. Doing HEP. It hasn't been at the level to use heat or ice. Doing a lot of walking.  \"Just stifness\"-not real pain/      Pain:  Patient Currently in Pain: Yes  Pain Level: 3  Pain Type: Chronic pain  Pain Location: Back  Pain Orientation: Left  Pain Descriptors: Aching      Objective       Exercises  Exercise 1: NuStep warm up Seat 11/ Arms 11 x5 mins  Exercise 2: Stretches - Piriformis leg crossed and pulling knee towards him, hamstring with strap, knee to chest stretch 3x 30 sec   Exercise 3: Posterior tilt, bridge with ball squeeze, marching, SLR, x10 each, and clamshell x15  Exercise 4: Standing gastroc and soleus stretch 3x 30 sec each  Exercise 5: Quadruped x10 UEs , LEs 2x5  abd bracing   Exercise 6: 3 way hip 0 band x 10 B  Add band next visit  abd bracing   Exercise 7: x10 B heel/toe rasies, marching,and squats- hinge position  pain free   Exercise 8: lunge stretch x 3 hold 15 sec B st step          Activity Tolerance:  Activity Tolerance: Patient Tolerated treatment well  Activity Tolerance: 0 pain-\"I can just feel it. \" Updated HEP    Assessment: Body structures, Functions, Activity limitations: Decreased ROM, Decreased strength, Decreased balance, Increased Pain, Decreased high-level IADLs  Assessment: Reviewed HEP and updated. No increased pain-\"I just feel it. \" Monitor response to updated exs. Perform in pain free ROM. Prognosis: Excellent  No Skilled PT: Independent with functional mobility     Patient Education:  Patient Education: Continue HEP                       Plan:  Times per week: 3  Plan weeks: 8  Specific instructions for Next Treatment: No excessive double knee to chest, lumbar fusion. No lifting >10 lbs, no trunk rotation. Review exercises from HEP packet, Hip flexibility (hip flexor supine off edge, piriformis, hamstring), Core abdominal bracing, leg raises x3, bridges, Nautilus equipment  Current Treatment Recommendations: Strengthening, ROM, Balance Training, Stair training, Gait Training, Manual Therapy - Joint Manipulation, Manual Therapy - Soft Tissue Mobilization, Modalities, Home Exercise Program    Goals:  Patient goals : Return to prior level working 12 hours     Short term goals  Time Frame for Short term goals: 4 weeks  Short term goal 1: Patient will report ability to sit one hour at work without increased stiffness 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    Short term goal 2: Patient will increase BLE flexibility to 0-90 deg hamstring, and piriformis leg crossed knee to chest 0-120 deg, hip flexor 0-15 deg extension off edge in order to decrease strain on lumbar spine with bending. Short term goal 3: Patient will demonstrate good abdominal bracing with SLR x3 directions in order to stabilize lumbar spine with transfers and lifting. Short term goal 4: Patient will be IND with HEP in order to meet long term goals. Long term goals  Time Frame for Long term goals : 8 weeks  Long term goal 1: Patient will improve score of Oswestry LBP disability questionnaire from baseline 33% impairment to less than 15% in order to tolerate work activities. Long term goal 2: Patient will squat to lift 25# box from floor to standing height using good lifting mechanics in order to perform household tasks and lift items at work. Long term goal 3: Patient will report ability to tolerate full 12 hour shift without any onset of LLE symptoms or back pain greater than 3/10. Long term goal 4: Patient will increase BLE strength to 5/5 for hips, knees, and ankles in order to squat, lunge, and climb steps without UE support. Long term goal 5: Patient will balance in either SLS x15 sec no LOB in order to climb on and off forklift, climb stairs safely.      Colby [id number]

## 2019-05-31 ENCOUNTER — HOSPITAL ENCOUNTER (OUTPATIENT)
Dept: PHYSICAL THERAPY | Age: 59
Setting detail: THERAPIES SERIES
Discharge: HOME | End: 2019-05-31
Payer: COMMERCIAL

## 2019-05-31 PROCEDURE — 97110 THERAPEUTIC EXERCISES: CPT

## 2019-05-31 ASSESSMENT — PAIN DESCRIPTION - LOCATION: LOCATION: BACK

## 2019-05-31 ASSESSMENT — PAIN DESCRIPTION - PAIN TYPE: TYPE: CHRONIC PAIN

## 2019-05-31 ASSESSMENT — PAIN SCALES - GENERAL: PAINLEVEL_OUTOF10: 3

## 2019-05-31 ASSESSMENT — PAIN DESCRIPTION - ORIENTATION: ORIENTATION: LEFT

## 2019-05-31 NOTE — PROGRESS NOTES
[place]     Time In: 930  Time Out: 1000  Minutes: 30  Timed Code Treatment Minutes: 30 Minutes                Date: 2019  Patient Name: Liam Holbrook,  Gender:  male        CSN: [id number]   : 1960  (60 y.o.)  Referral Date : 19    Referring Practitioner: Carolyn Osler, DO      Diagnosis: M54.16 (ICD-10-CM) - Radiculopathy, lumbar region  Treatment Diagnosis: Lumbago, spinal stiffness, B hip stiffness, muscular weakness   Additional Pertinent Hx: Lumbar fusion L4/L5 , and again 2019 due to loose hardware. General:  PT Visit Information  Onset Date: 19  PT Insurance Information: [employer] - APPROVED FOR A TOTAL OF 18 VISITS FROM 19 TO 19, 3 TIMES A WEEK FOR 6 WEEKS. Modalities covered, no aquatic, no ionto  Total # of Visits Approved: 18  Total # of Visits to Date: 4  Plan of Care/Certification Expiration Date: 19  Progress Note Counter: 4/10 for PN               Subjective:  Chart Reviewed: Yes  Patient assessed for rehabilitation services?: Yes  Response To Previous Treatment: Patient with no complaints from previous session. Family / Caregiver Present: No  Comments: Physician follow up:  Dr. Berger Erp: No increased pain after last session. \"Just stiffness. \"      Pain:     Pain Level: 3  Pain Type: Chronic pain  Pain Location: Back  Pain Orientation: Left      Objective         Exercises  Exercise 1: NuStep warm up Seat 11/ Arms 11 x5 mins  L-3   Exercise 2: Stretches - Piriformis leg crossed and pulling knee towards him,(HEP today due to time hamstring with strap,) knee to chest stretch 3x 30 sec   Exercise 5: Quadruped x10 UEs , LEs x10  abd bracing   Exercise 6: 3 way hip 0 band x 10 B  peach band  abd bracing   Exercise 7: x10 B heel/toe rasies, marching,and squats- hinge position  pain free   Exercise 8: lunge stretch x 3 hold 15 sec B st step , standing hamstring stertch x 3 hold 15 sec B   Exercise 9: Seated on silver disc x 10 B marching, LAQ, hip abd with orange t band, sh rows/ext ,  horiz abd and B Diag x10          Activity Tolerance:  Activity Tolerance: Patient Tolerated treatment well  Activity Tolerance: \"Just stiff. \"    Assessment: Body structures, Functions, Activity limitations: Decreased ROM, Decreased strength, Decreased balance, Increased Pain, Decreased high-level IADLs  Assessment: Progressed core stabs and tolerated well. Issued ornage t band for 3 way hip. Perform in paiin free ROM. Trial heat for \"stiffness. \"  Monitor response to today's session. Progressing well. Prognosis: Excellent  REQUIRES PT FOLLOW UP: Yes  Discharge Recommendations: Continue to assess pending progress    Patient Education:  Patient Education: Continue HEP orange t band for 3 way hip                      Plan:  Times per week: 3  Plan weeks: 8  Specific instructions for Next Treatment: No excessive double knee to chest, lumbar fusion. No lifting >10 lbs, no trunk rotation. Review exercises from HEP packet, Hip flexibility (hip flexor supine off edge, piriformis, hamstring), Core abdominal bracing, leg raises x3, bridges, Nautilus equipment  Current Treatment Recommendations: Strengthening, ROM, Balance Training, Stair training, Gait Training, Manual Therapy - Joint Manipulation, Manual Therapy - Soft Tissue Mobilization, Modalities, Home Exercise Program    Goals:  Patient goals : Return to prior level working 12 hours     Short term goals  Time Frame for Short term goals: 4 weeks  Short term goal 1: Patient will report ability to sit one hour at work without increased stiffness 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    Short term goal 2: Patient will increase BLE flexibility to 0-90 deg hamstring, and piriformis leg crossed knee to chest 0-120 deg, hip flexor 0-15 deg extension off edge in order to decrease strain on lumbar spine with bending. Short term goal 3: Patient will demonstrate good abdominal bracing with SLR x3 directions in order to stabilize lumbar spine with transfers and lifting. Short term goal 4: Patient will be IND with HEP in order to meet long term goals. Long term goals  Time Frame for Long term goals : 8 weeks  Long term goal 1: Patient will improve score of Oswestry LBP disability questionnaire from baseline 33% impairment to less than 15% in order to tolerate work activities. Long term goal 2: Patient will squat to lift 25# box from floor to standing height using good lifting mechanics in order to perform household tasks and lift items at work. Long term goal 3: Patient will report ability to tolerate full 12 hour shift without any onset of LLE symptoms or back pain greater than 3/10. Long term goal 4: Patient will increase BLE strength to 5/5 for hips, knees, and ankles in order to squat, lunge, and climb steps without UE support. Long term goal 5: Patient will balance in either SLS x15 sec no LOB in order to climb on and off forklift, climb stairs safely.      [employer]  [id number]

## 2019-06-04 ENCOUNTER — HOSPITAL ENCOUNTER (OUTPATIENT)
Dept: PHYSICAL THERAPY | Age: 59
Setting detail: THERAPIES SERIES
Discharge: HOME | End: 2019-06-04
Payer: COMMERCIAL

## 2019-06-04 PROCEDURE — 97110 THERAPEUTIC EXERCISES: CPT

## 2019-06-04 ASSESSMENT — PAIN DESCRIPTION - ORIENTATION: ORIENTATION: LEFT

## 2019-06-04 ASSESSMENT — PAIN SCALES - GENERAL: PAINLEVEL_OUTOF10: 3

## 2019-06-04 ASSESSMENT — PAIN DESCRIPTION - PAIN TYPE: TYPE: CHRONIC PAIN

## 2019-06-04 ASSESSMENT — PAIN DESCRIPTION - LOCATION: LOCATION: BACK

## 2019-06-04 NOTE — PROGRESS NOTES
[place]     Time In: 1162  Time Out: 1425  Minutes: 30  Timed Code Treatment Minutes: 30 Minutes       Date: 2019  Patient Name: Sathya Whitaker,  Gender:  male        CSN: [id number]   : 1960  (60 y.o.)  Referral Date : 19    Referring Practitioner: Keke Muhammad DO      Diagnosis: M54.16 (ICD-10-CM) - Radiculopathy, lumbar region  Treatment Diagnosis: Lumbago, spinal stiffness, B hip stiffness, muscular weakness   Additional Pertinent Hx: Lumbar fusion L4/L5 , and again 2019 due to loose hardware. General:  PT Visit Information  Onset Date: 19  PT Insurance Information: [employer] - APPROVED FOR A TOTAL OF 18 VISITS FROM 19 TO 19, 3 TIMES A WEEK FOR 6 WEEKS. Modalities covered, no aquatic, no ionto  Total # of Visits Approved: 18  Total # of Visits to Date: 5  Plan of Care/Certification Expiration Date: 19  Progress Note Counter: 5/10 for PN               Subjective:  Chart Reviewed: Yes  Patient assessed for rehabilitation services?: Yes  Response To Previous Treatment: Patient with no complaints from previous session. Family / Caregiver Present: No  Comments: Physician follow up:  Dr. Fernandez Josue: Patient has been wearing his new elastic lumbar support brace at work, needs to change positions frequently to avoid stiffness and tight pain. 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       Pain:  Patient Currently in Pain: Yes  Pain Assessment: 0-10  Pain Level: 3  Pain Type: Chronic pain  Pain Location: Back  Pain Orientation: Left      Objective    Exercises  Exercise 1: NuStep warm up Seat 11/ Arms 11 x5 mins  L4  Exercise 2: Stretches - Piriformis leg crossed and pulling knee towards him,(HEP today due to time hamstring with strap, knee to chest stretch 3x 30 sec )  Exercise 3: Bridge with ball squeeze, SLR, x15 each   Exercise 4: Standing gastroc and soleus stretch 3x 30 sec each   Exercise 5: Quadruped x10 UEs , LEs x10, combo UE/LE x10  abd bracing   Exercise 6: 3 way hip 0 band x 10 B no UE   Exercise 7: x10 B heel/toe rasies, marching,and squats- hinge position  pain free   Exercise 8: lunge stretch x 3 hold 15 sec B st step , standing hamstring stretch x 3 hold 15 sec B   Exercise 10: Hydrostick 4 directions x10 reps each          Activity Tolerance:  Activity Tolerance: Patient Tolerated treatment well    Assessment: Body structures, Functions, Activity limitations: Decreased ROM, Decreased strength, Decreased balance, Increased Pain, Decreased high-level IADLs  Assessment: Patient progressing well with standing program, added hydrostick, did not have time for seated stabilization today but need to continue these. Performing HEP well. Prognosis: Excellent  REQUIRES PT FOLLOW UP: Yes  Discharge Recommendations: Continue to assess pending progress    Patient Education:  Patient Education: Continue HEP orange t band for 3 way hip                      Plan:  Times per week: 3  Plan weeks: 8  Specific instructions for Next Treatment: No excessive double knee to chest, lumbar fusion. No lifting >10 lbs, no trunk rotation.  Review exercises from HEP packet, Hip flexibility (hip flexor supine off edge, piriformis, hamstring), Core abdominal bracing, leg raises x3, bridges, Nautilus equipment  Current Treatment Recommendations: Strengthening, ROM, Balance Training, Stair training, Gait Training, Manual Therapy - Joint Manipulation, Manual Therapy - Soft Tissue Mobilization, Modalities, Home Exercise Program    Goals:  Patient goals : Return to prior level working 12 hours     Short term goals  Time Frame for Short term goals: 4 weeks  Short term goal 1: Patient will report ability to sit one hour at work without increased stiffness 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 Short term goal 2: Patient will increase BLE flexibility to 0-90 deg hamstring, and piriformis leg crossed knee to chest 0-120 deg, hip flexor 0-15 deg extension off edge in order to decrease strain on lumbar spine with bending. Short term goal 3: Patient will demonstrate good abdominal bracing with SLR x3 directions in order to stabilize lumbar spine with transfers and lifting. Short term goal 4: Patient will be IND with HEP in order to meet long term goals. Long term goals  Time Frame for Long term goals : 8 weeks  Long term goal 1: Patient will improve score of Oswestry LBP disability questionnaire from baseline 33% impairment to less than 15% in order to tolerate work activities. Long term goal 2: Patient will squat to lift 25# box from floor to standing height using good lifting mechanics in order to perform household tasks and lift items at work. Long term goal 3: Patient will report ability to tolerate full 12 hour shift without any onset of LLE symptoms or back pain greater than 3/10. Long term goal 4: Patient will increase BLE strength to 5/5 for hips, knees, and ankles in order to squat, lunge, and climb steps without UE support. Long term goal 5: Patient will balance in either SLS x15 sec no LOB in order to climb on and off forklift, climb stairs safely.      Beau Harrison, PT

## 2019-06-05 ENCOUNTER — HOSPITAL ENCOUNTER (OUTPATIENT)
Dept: PHYSICAL THERAPY | Age: 59
Setting detail: THERAPIES SERIES
Discharge: HOME | End: 2019-06-05
Payer: COMMERCIAL

## 2019-06-05 PROCEDURE — 97110 THERAPEUTIC EXERCISES: CPT

## 2019-06-07 ENCOUNTER — HOSPITAL ENCOUNTER (OUTPATIENT)
Dept: PHYSICAL THERAPY | Age: 59
Setting detail: THERAPIES SERIES
Discharge: HOME | End: 2019-06-07
Payer: COMMERCIAL

## 2019-06-07 PROCEDURE — 97110 THERAPEUTIC EXERCISES: CPT

## 2019-06-07 NOTE — PROGRESS NOTES
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 Short term goal 2: Patient will increase BLE flexibility to 0-90 deg hamstring, and piriformis leg crossed knee to chest 0-120 deg, hip flexor 0-15 deg extension off edge in order to decrease strain on lumbar spine with bending. Short term goal 3: Patient will demonstrate good abdominal bracing with SLR x3 directions in order to stabilize lumbar spine with transfers and lifting. Short term goal 4: Patient will be IND with HEP in order to meet long term goals. Long term goals  Time Frame for Long term goals : 8 weeks  Long term goal 1: Patient will improve score of Oswestry LBP disability questionnaire from baseline 33% impairment to less than 15% in order to tolerate work activities. Long term goal 2: Patient will squat to lift 25# box from floor to standing height using good lifting mechanics in order to perform household tasks and lift items at work. Long term goal 3: Patient will report ability to tolerate full 12 hour shift without any onset of LLE symptoms or back pain greater than 3/10. Long term goal 4: Patient will increase BLE strength to 5/5 for hips, knees, and ankles in order to squat, lunge, and climb steps without UE support. Long term goal 5: Patient will balance in either SLS x15 sec no LOB in order to climb on and off forklift, climb stairs safely.      [facility]  [id number]

## 2019-06-10 ENCOUNTER — HOSPITAL ENCOUNTER (OUTPATIENT)
Dept: PHYSICAL THERAPY | Age: 59
Setting detail: THERAPIES SERIES
Discharge: HOME | End: 2019-06-10
Payer: COMMERCIAL

## 2019-06-10 PROCEDURE — 97110 THERAPEUTIC EXERCISES: CPT

## 2019-06-10 NOTE — PROGRESS NOTES
[facility] [place]     Time In: 1130  Time Out: 1292  Minutes: 43  Timed Code Treatment Minutes: 43 Minutes                Date: 6/10/2019  Patient Name: Francia Interiano,  Gender:  male        CSN: [id number]   : 1960  (60 y.o.)  Referral Date : 19    Referring Practitioner: Dandy Rosa DO      Diagnosis: M54.16 (ICD-10-CM) - Radiculopathy, lumbar region  Treatment Diagnosis: Lumbago, spinal stiffness, B hip stiffness, muscular weakness   Additional Pertinent Hx: Lumbar fusion L4/L5 , and again 2019 due to loose hardware. General:  PT Visit Information  Onset Date: 19  PT Insurance Information: [employer] - APPROVED FOR A TOTAL OF 18 VISITS FROM 19 TO 19, 3 TIMES A WEEK FOR 6 WEEKS. Modalities covered, no aquatic, no ionto  Total # of Visits Approved: 18  Total # of Visits to Date: 8  Plan of Care/Certification Expiration Date: 19  Progress Note Counter: 8/10 for PN               Subjective:  Chart Reviewed: Yes  Patient assessed for rehabilitation services?: Yes  Family / Caregiver Present: No  Comments: Physician follow up:  Dr. Santiago Gannon: Pt reports \"every since last session, I had some burning behind R knee off and on. None today. Took Aleve last night. It started after I felt that pop doing my LTR last time. We went to [place] over the weekend and I didn't have any problem in the car or walking around. LB just feels tight.  \"     Pain:  Patient Currently in Pain: No         Objective             Exercises  Exercise 1: Sports Bike L-2 x 5 min abd bracing and posture  Pt denies pain   Exercise 2: Stretches - Piriformis leg crossed and pulling knee towards him, and SKTC x3 B hold 15 sec,(HEP today >hamstring with strap)   Exercise 4: Standing gastroc and soleus stretch 3x 30 sec each   Exercise 5: Quadruped  combo UE/LE x15  abd bracing Exercise 6: 3 way hip peach  band x 15 B    Exercise 7: x15 B heel/toe rasies, marching,and squats- hinge position  pain free  on foam   Exercise 8: lunge stretch x 3 hold 15 sec B st step , standing hamstring stretch x 3 hold 15 sec B   Exercise 9: Seated on silver disc x 20  B marching, LAQ, hip abd with green t band,  hip add squeezes x20, sh rows/ext,horiz abd and B Diag x20  Exercise 10: Hydrostick 4 directions x15 reps each  B step stance  Exercise 11: hydro chest L-5 x20  Exercise 12: hydro hip L-4 2x10   Exercise 13: Step ups each leg CC x 10 fwd 4'' abd braicng         Activity Tolerance:  Activity Tolerance: Patient Tolerated treatment well  Activity Tolerance: No burning or pain today. Pt slightly guarded with transfers and mobility. \"I'm just not wanting a set back and I tend to guard. \"     Assessment: Body structures, Functions, Activity limitations: Decreased ROM, Decreased strength, Decreased balance, Increased Pain, Decreased high-level IADLs  Assessment: Pt tolertaed gentle progression of exs well. Monitor response to today's session. No pain or burning behind L knee reported. LB just \"stiff. \"  Pt going to 12 hour shifts tonight- 2 days in a row. Prognosis: Excellent  REQUIRES PT FOLLOW UP: Yes  Discharge Recommendations: Continue to assess pending progress    Patient Education:  Patient Education: Continue HEP, monitor response to today's session, green band for UE exs at home                      Plan:  Times per week: 3  Plan weeks: 8  Specific instructions for Next Treatment: No excessive double knee to chest, lumbar fusion. No lifting >10 lbs, no trunk rotation.  Review exercises from HEP packet, Hip flexibility (hip flexor supine off edge, piriformis, hamstring), Core abdominal bracing, leg raises x3, bridges, Nautilus equipment  Current Treatment Recommendations: Strengthening, ROM, Balance Training, Stair training, Gait Training, Manual Therapy - Joint Manipulation, Manual Therapy - Soft Tissue Mobilization, Modalities, Home Exercise Program    Goals:  Patient goals : Return to prior level working 12 hours     Short term goals  Time Frame for Short term goals: 4 weeks  Short term goal 1: Patient will report ability to sit one hour at work without increased stiffness 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 Short term goal 2: Patient will increase BLE flexibility to 0-90 deg hamstring, and piriformis leg crossed knee to chest 0-120 deg, hip flexor 0-15 deg extension off edge in order to decrease strain on lumbar spine with bending. Short term goal 3: Patient will demonstrate good abdominal bracing with SLR x3 directions in order to stabilize lumbar spine with transfers and lifting. Short term goal 4: Patient will be IND with HEP in order to meet long term goals. Long term goals  Time Frame for Long term goals : 8 weeks  Long term goal 1: Patient will improve score of Oswestry LBP disability questionnaire from baseline 33% impairment to less than 15% in order to tolerate work activities. Long term goal 2: Patient will squat to lift 25# box from floor to standing height using good lifting mechanics in order to perform household tasks and lift items at work. Long term goal 3: Patient will report ability to tolerate full 12 hour shift without any onset of LLE symptoms or back pain greater than 3/10. Long term goal 4: Patient will increase BLE strength to 5/5 for hips, knees, and ankles in order to squat, lunge, and climb steps without UE support. Long term goal 5: Patient will balance in either SLS x15 sec no LOB in order to climb on and off forklift, climb stairs safely.      Semaj Devlin  [id number]

## 2019-06-12 ENCOUNTER — HOSPITAL ENCOUNTER (OUTPATIENT)
Dept: PHYSICAL THERAPY | Age: 59
Setting detail: THERAPIES SERIES
Discharge: HOME | End: 2019-06-12
Payer: COMMERCIAL

## 2019-06-12 PROCEDURE — 97110 THERAPEUTIC EXERCISES: CPT

## 2019-06-12 ASSESSMENT — PAIN SCALES - GENERAL: PAINLEVEL_OUTOF10: 3

## 2019-06-12 NOTE — PROGRESS NOTES
[place]     Time In: 1430  Time Out: 0951  Minutes: 42  Timed Code Treatment Minutes: 42 Minutes                Date: 2019  Patient Name: Kathi Lujan,  Gender:  male        CSN: [id number]   : 1960  (60 y.o.)  Referral Date : 19    Referring Practitioner: Sanket Sawant DO      Diagnosis: M54.16 (ICD-10-CM) - Radiculopathy, lumbar region  Treatment Diagnosis: Lumbago, spinal stiffness, B hip stiffness, muscular weakness   Additional Pertinent Hx: Lumbar fusion L4/L5 , and again 2019 due to loose hardware. General:  PT Visit Information  Onset Date: 19  PT Insurance Information: [employer] - APPROVED FOR A TOTAL OF 18 VISITS FROM 19 TO 19, 3 TIMES A WEEK FOR 6 WEEKS. Modalities covered, no aquatic, no ionto  Total # of Visits Approved: 18  Total # of Visits to Date: 5  Plan of Care/Certification Expiration Date: 19  Progress Note Counter: 9/10 for PN               Subjective:  Chart Reviewed: Yes  Patient assessed for rehabilitation services?: Yes  Family / Caregiver Present: No  Comments: Physician follow up:  Dr. Dhaval Glynn: Still struggling with the general stiffness; hoping to be as close to normal as possible and he's not sure that it's helping. Just a bit frustrated today. Not sure if therapy is helping much.  Still on 10# restriction; no bending, twisting; keep moving      Pain:  Patient Currently in Pain: Yes  Pain Level: 3      Objective       Exercises  Exercise 1: Sports Bike L-2 x 6 min abd bracing and posture  Pt denies pain   Exercise 2: Stretches - Piriformis leg crossed and pulling knee towards him, and SKTC x3 B hold 15 sec,(HEP today >hamstring with strap) performed on moist heat   Exercise 3: Bridge with purple band around knees to engage glute med: 10x  Exercise 5: Quadruped  combo UE/LE x15  abd Short term goals: 4 weeks  Short term goal 1: Patient will report ability to sit one hour at work without increased stiffness 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 Short term goal 2: Patient will increase BLE flexibility to 0-90 deg hamstring, and piriformis leg crossed knee to chest 0-120 deg, hip flexor 0-15 deg extension off edge in order to decrease strain on lumbar spine with bending. Short term goal 3: Patient will demonstrate good abdominal bracing with SLR x3 directions in order to stabilize lumbar spine with transfers and lifting. Short term goal 4: Patient will be IND with HEP in order to meet long term goals. Long term goals  Time Frame for Long term goals : 8 weeks  Long term goal 1: Patient will improve score of Oswestry LBP disability questionnaire from baseline 33% impairment to less than 15% in order to tolerate work activities. Long term goal 2: Patient will squat to lift 25# box from floor to standing height using good lifting mechanics in order to perform household tasks and lift items at work. Long term goal 3: Patient will report ability to tolerate full 12 hour shift without any onset of LLE symptoms or back pain greater than 3/10. Long term goal 4: Patient will increase BLE strength to 5/5 for hips, knees, and ankles in order to squat, lunge, and climb steps without UE support. Long term goal 5: Patient will balance in either SLS x15 sec no LOB in order to climb on and off forklift, climb stairs safely.      Victoriano Jonas, PT

## 2019-06-14 ENCOUNTER — HOSPITAL ENCOUNTER (OUTPATIENT)
Dept: PHYSICAL THERAPY | Age: 59
Setting detail: THERAPIES SERIES
Discharge: HOME | End: 2019-06-14
Payer: COMMERCIAL

## 2019-06-14 PROCEDURE — 97110 THERAPEUTIC EXERCISES: CPT

## 2019-06-14 ASSESSMENT — PAIN DESCRIPTION - PAIN TYPE: TYPE: CHRONIC PAIN

## 2019-06-14 ASSESSMENT — PAIN DESCRIPTION - LOCATION: LOCATION: BACK

## 2019-06-14 ASSESSMENT — PAIN SCALES - GENERAL: PAINLEVEL_OUTOF10: 3

## 2019-06-14 ASSESSMENT — PAIN DESCRIPTION - ORIENTATION: ORIENTATION: LEFT

## 2019-06-14 NOTE — PROGRESS NOTES
[place]     Time In: 830  Time Out: 6  Minutes: 44  Timed Code Treatment Minutes: 44 Minutes                Date: 2019  Patient Name: Tiffany Hull,  Gender:  male        CSN: [id number]   : 1960  (60 y.o.)  Referral Date : 19    Referring Practitioner: Kirby Britton DO      Diagnosis: M54.16 (ICD-10-CM) - Radiculopathy, lumbar region  Treatment Diagnosis: Lumbago, spinal stiffness, B hip stiffness, muscular weakness   Additional Pertinent Hx: Lumbar fusion L4/L5 , and again 2019 due to loose hardware. General:  PT Visit Information  Onset Date: 19  PT Insurance Information: [employer] - APPROVED FOR A TOTAL OF 18 VISITS FROM 19 TO 19, 3 TIMES A WEEK FOR 6 WEEKS. Modalities covered, no aquatic, no ionto  Total # of Visits Approved: 18  Total # of Visits to Date: 10  Plan of Care/Certification Expiration Date: 19  Progress Note Counter: 10/10 for PN               Subjective:  Chart Reviewed: Yes  Patient assessed for rehabilitation services?: Yes  Response To Previous Treatment: Patient with no complaints from previous session. Family / Caregiver Present: No  Comments: Physician follow up:  Dr. Radha Salinas      Subjective: 12 hour shifts are rough. I feel like my back wants me to lay down, towards the end. Numb anterior L thigh. Stiffness in LB, intermittent burning behind  R knee.       Pain:  Patient Currently in Pain: Yes  Pain Level: 3  Pain Type: Chronic pain  Pain Location: Back  Pain Orientation: Left      Objective                 Exercises  Exercise 1: Sports Bike L-2 x 6 min abd bracing and posture  Pt denies pain   Exercise 2: Stretches - Piriformis leg crossed and pulling knee towards him, and SKTC x3 B hold 15 sec,(HEP today >hamstring with strap) performed on moist heat   Exercise 3: Bridge with purple band around knees to engage glute med: 10x  Exercise 5: Quadruped  combo UE/LE x15  abd bracing-emphasis on controlling his weaker right side  x10 Ue and LE separate today,-emphasis to slow down and focus on control  Exercise 8: lunge stretch x 3 hold 15 sec B st step , standing hamstring stretch x 3 hold 15 sec B   Exercise 10: Hydrostick 4 directions x15 reps each  B step stance  Exercise 11: hydro chest L-5 x20  Exercise 12: hydro hip L-4 2x10   Exercise 13: Step ups each leg CC x 10 fwd 6'' abd braicng  Exercise 14: Seated on blue swiss ball x 10 marching, LAQ,, sh horiz sh abd x15 green band, Diag x 15 B green band   Exercise 15: Modified plank on knees and elbows: 2x30 seconds   Exercise 16: Side lying clam shells: added green band: 15x on right side   Exercise 17: Ball squats: abdominal bracing: 10x         Activity Tolerance:  Activity Tolerance: Patient Tolerated treatment well  Activity Tolerance: Pt tolerated session well. Pt seems fatigued from increasing to 12 hour days. Monitor pain/fatigue. Assessment: Body structures, Functions, Activity limitations: Decreased ROM, Decreased strength, Decreased balance, Increased Pain, Decreased high-level IADLs  Assessment: Added swiss ball vs sitting on silver disc today and diff maintainng control with marching and LAQs -cues for control. Reviewed and empahszied to slow down with basic exs to continue further proper strengthening. Issued green band for clamshells and purple for bridges for HEP. Pt continues to perform bed mobiity and transfers with mild guarding. Cont to review planks/mat exs for ship/core strengtheing. Monitor response to today's session. Prognosis: Excellent  REQUIRES PT FOLLOW UP: Yes  Discharge Recommendations: Continue to assess pending progress    Patient Education:  Patient Education: HEP, slow down anf focus on contril-sudeep with quadruped exs.  Issued purple band for bridges and green for clamshells                       Plan:  Times per week: 3  Plan lunge, and climb steps without UE support. Long term goal 5: Patient will balance in either SLS x15 sec no LOB in order to climb on and off forklift, climb stairs safely.      Marin Marks  [id number]

## 2019-06-18 ENCOUNTER — APPOINTMENT (OUTPATIENT)
Dept: PHYSICAL THERAPY | Age: 59
End: 2019-06-18
Payer: COMMERCIAL

## 2019-06-19 ENCOUNTER — APPOINTMENT (OUTPATIENT)
Dept: PHYSICAL THERAPY | Age: 59
End: 2019-06-19
Payer: COMMERCIAL

## 2019-06-20 ENCOUNTER — HOSPITAL ENCOUNTER (OUTPATIENT)
Dept: PHYSICAL THERAPY | Age: 59
Setting detail: THERAPIES SERIES
Discharge: HOME | End: 2019-06-20
Payer: COMMERCIAL

## 2019-06-20 PROCEDURE — 97110 THERAPEUTIC EXERCISES: CPT

## 2019-06-20 NOTE — PROGRESS NOTES
questionnaire has been designed to give us information as to how your back or leg pain is affecting your ability to manage in everday life. Please answer by checking ONE box in each section for the statement that best applies to you. We realize you may consider that two or more statements in any one section apply but please just check the spot that indicates the statement which most clearly describes your problem. Section 1 - Pain Intensity The pain is very mild at the moment = 1   Section 2 - Personal Care (Washing, dressing, etc) I can look after myself normally without causing extra pain = 0   Section 3 - Lifting I can lift very light weights = 4   Section 4 - Walking Pain does not prevent me walking any distance = 0     Section 5 - Sitting   Pain prevents me from sitting more than 30 minutes = 3   Section 6 - Standing I can stand as long as I want but it gives me extra pain = 1   Section 7 - Sleeping My sleep is occasionally disturbed by pain = 1   Section 8 - Sex Life (if applicable) Not applicable   Section 9 - Social Life Pain has no significant effect on my social life apart from limiting my more energetic interests, eg. Sport\" = 2     Section 10 - Travelling I can travel anywhere but it gives me extra pain = 1   Total Score  13/45     Total score/total possible score   X 100  29%       Score Interpretation:  0%-20%: minimal disability The patient can cope with most living activities. Usually no treatment is indicated apart from advice on lifting sitting and exercise. 21%-40%: moderate disability The patient experiences more pain and difficulty with sitting, lifting and standing. Travel and social life are more difficult and they may be disabled from work. Personal care, sexual activity and sleeping are not grossly affected and the patient can usually be managed by conservative means.    41%-60%: severe disability Pain remains themain problem with this group but activities of daily living are affected. These patient require a detailed investigation. 61%-80%: crippled Back pain impinges on all aspects of the patient's life. Positive intervention is required. 81%-100%: These patients are either bed-bound or exaggerating their symptoms. MAN Espinal and Jas ROLON (2000)  The Oswestry Disability Index. Spine, 95(19):[id number]. Activity Tolerance:  Activity Tolerance: Patient Tolerated treatment well    Assessment: Body structures, Functions, Activity limitations: Decreased ROM, Decreased strength, Decreased balance, Increased Pain, Decreased high-level IADLs  Assessment: Patient is making progress toward therapy goals. He demonstrates good abdominal bracing technique during program, but still has stiffness in lumbar region and left leg weakness. He is also having newer onset of R posterior knee burning nerve pain, started a few weeks ago. Prognosis: Excellent  REQUIRES PT FOLLOW UP: Yes  Discharge Recommendations: Continue to assess pending progress    Patient Education:  Patient Education: Continue HEP           Plan:  Times per week: 3  Plan weeks: 8  Specific instructions for Next Treatment: No excessive double knee to chest, lumbar fusion. No lifting >10 lbs, no trunk rotation. Review exercises from HEP packet, Hip flexibility (hip flexor supine off edge, piriformis, hamstring), Core abdominal bracing, leg raises x3, bridges, Nautilus equipment  Current Treatment Recommendations: Strengthening, ROM, Balance Training, Stair training, Gait Training, Manual Therapy - Joint Manipulation, Manual Therapy - Soft Tissue Mobilization, Modalities, Home Exercise Program    Goals:  Patient goals : Return to prior level working 12 hours     Short term goals  Time Frame for Short term goals: 4 weeks   Short term goal 1: Patient will report ability to sit one hour at work without increased stiffness 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  - Not Met 6/20/19 - tolerates about 30 minutes sitting before needing to stand and walk   Short term goal 2: Patient will increase BLE flexibility to 0-90 deg hamstring, and piriformis leg crossed knee to chest 0-120 deg, hip flexor 0-15 deg extension off edge in order to decrease strain on lumbar spine with bending. - Not Met 6/20/19 - 0-75 deg hamstring, piriformis WFL and hip flexor off edge 0-15 deg. Short term goal 3: Patient will demonstrate good abdominal bracing with SLR x3 directions in order to stabilize lumbar spine with transfers and lifting. - Goal Met 6/20/19 - demonstrates good abdominal bracing with leg raises, no increased pain  Short term goal 4: Patient will be IND with HEP in order to meet long term goals. - Goal Met 6/20/19 - uses orthoneuro packet, CONTINUE GOAL for added strengthening. Long term goals  Time Frame for Long term goals : 8 weeks  Long term goal 1: Patient will improve score of Oswestry LBP disability questionnaire from baseline 33% impairment to less than 15% in order to tolerate work activities. - Not Met 6/20/19 - score 29% impairment   Long term goal 2: Patient will squat to lift 25# box from floor to standing height using good lifting mechanics in order to perform household tasks and lift items at work. - Not Met 6/20/19 - progress made - full squat ROM,, able to lift 10# box but mild soreness. also 1/2 kneeling to standing lift tolerates well. Long term goal 3: Patient will report ability to tolerate full 12 hour shift without any onset of LLE symptoms or back pain greater than 3/10. - Goal Met 6/20/19 - occasional left thigh numbness, intermittent R posterior knee burning. 12 hour shift with no more than 3/10 pain. Long term goal 4: Patient will increase BLE strength to 5/5 for hips, knees, and ankles in order to squat, lunge, and climb steps without UE support.  - Not formally tested - strength progressing  Long term goal 5: Patient will balance in either SLS x15 sec no LOB in order to climb on and off forkbriannaft, climb stairs safely. - Goal Met 6/20/19 - balancing 15 sec either foot, climbing stairs without difficulty.      Debbie Moreno, PT

## 2019-06-21 ENCOUNTER — HOSPITAL ENCOUNTER (OUTPATIENT)
Dept: PHYSICAL THERAPY | Age: 59
Setting detail: THERAPIES SERIES
Discharge: HOME | End: 2019-06-21
Payer: COMMERCIAL

## 2019-06-21 PROCEDURE — 97110 THERAPEUTIC EXERCISES: CPT

## 2019-06-21 ASSESSMENT — PAIN SCALES - GENERAL: PAINLEVEL_OUTOF10: 3

## 2019-06-21 ASSESSMENT — PAIN DESCRIPTION - PAIN TYPE: TYPE: CHRONIC PAIN

## 2019-06-21 ASSESSMENT — PAIN DESCRIPTION - LOCATION: LOCATION: BACK

## 2019-06-21 ASSESSMENT — PAIN DESCRIPTION - ORIENTATION: ORIENTATION: LEFT

## 2019-06-21 NOTE — PROGRESS NOTES
[place]     Time In: 1400  Time Out: 8530  Minutes: 43  Timed Code Treatment Minutes: 43 Minutes                Date: 2019  Patient Name: Margo Cabrera,  Gender:  male        CSN: [id number]   : 1960  (60 y.o.)  Referral Date : 19    Referring Practitioner: Tirso Ho DO      Diagnosis: M54.16 (ICD-10-CM) - Radiculopathy, lumbar region  Treatment Diagnosis: Lumbago, spinal stiffness, B hip stiffness, muscular weakness    Additional Pertinent Hx: Lumbar fusion L4/L5 , and again 2019 due to loose hardware. General:  PT Visit Information  Onset Date: 19  PT Insurance Information: [employer] - APPROVED FOR A TOTAL OF 18 VISITS FROM 19 TO 19, 3 TIMES A WEEK FOR 6 WEEKS. Modalities covered, no aquatic, no ionto  Total # of Visits Approved: 18  Total # of Visits to Date: 6  Plan of Care/Certification Expiration Date: 19  Progress Note Counter: 1/10 for PN                Subjective:  Chart Reviewed: Yes  Patient assessed for rehabilitation services?: Yes  Response To Previous Treatment: Patient with no complaints from previous session. Family / Caregiver Present: No  Comments: Physician follow up:  Dr. Singh Cho: I'm off for the weekend. I worked 12 hours 3 days in a row. Numbness L ant thigh when wearing back brace. Stiffness LLB>R. Doing HEP Pt using heat and ice at home for discomfort.  .     Pain:  Patient Currently in Pain: Yes  Pain Level: 3  Pain Type: Chronic pain  Pain Location: Back  Pain Orientation: Left      Objective                Exercises  Exercise 1: Sports Bike L-2 x 6 min abd bracing and posture  Pt denies pain   Exercise 2: Stretches -on MHP>  Piriformis leg crossed and pulling knee towards him, and SKTC x3 B hold 15 sec, hamstring with strap, hip flexor off edge of plinth 3x 15 sec   Exercise 3: Bridge with purple band around knees to engage glute med: 15x  Exercise 5: Quadruped  UEs x15, LEs x15 and combo UE/LE x15  abd bracing-emphasis on controlling his weaker right side  x15 improving satbility on R, but occ CGA x 1 for control -still note weakness on R when lifting LLE. Exercise 6: 3 way hip peach  band x 15 B    Exercise 14: Seated on blue swiss ball x 15 marching, LAQ,, sh horiz sh abd x20  green band, Diag x 20 B green band   Exercise 15: Modified plank on knees and elbows: 2x30 seconds   Exercise 16: Side lying clam shells: added green band: 15x on right side   Exercise 17: Ball squats: abdominal bracing: 15x  Exercise 18: Holding red ball x10 lunges B and small squats  no pain -watch knees          Activity Tolerance:  Activity Tolerance: Patient Tolerated treatment well  Activity Tolerance: Added standing lunges and squats holding red ball. No pain     Assessment: Body structures, Functions, Activity limitations: Decreased ROM, Decreased strength, Decreased balance, Increased Pain, Decreased high-level IADLs  Assessment: Good abd bracing with exs. Cont to note mild weakness on R hip/pelvic region when unlaoding LLE in quadruped. Pt aware and and midful of weakness. on R. Pt deneid pain throughout session. Less \"stiffness\" noted after session. Use heat/ice at home. Pt likes \"ice\" as well as heat. Pt appears fatigues from  3 -12 hour shifts at work. Prognosis: Excellent  REQUIRES PT FOLLOW UP: Yes  Discharge Recommendations: Continue to assess pending progress    Patient Education:  Patient Education: Continue HEP , monitor response from today's session. Abd bracing                      Plan:  Times per week: 3  Plan weeks: 8  Specific instructions for Next Treatment: No excessive double knee to chest, lumbar fusion. No lifting >10 lbs, no trunk rotation.  Review exercises from HEP packet, Hip flexibility (hip flexor supine off edge, piriformis, hamstring), Core abdominal bracing, leg raises x3, bridges,

## 2019-06-24 ENCOUNTER — HOSPITAL ENCOUNTER (OUTPATIENT)
Dept: PHYSICAL THERAPY | Age: 59
Setting detail: THERAPIES SERIES
Discharge: HOME | End: 2019-06-24
Payer: COMMERCIAL

## 2019-06-24 PROCEDURE — 97110 THERAPEUTIC EXERCISES: CPT

## 2019-06-24 ASSESSMENT — PAIN SCALES - GENERAL: PAINLEVEL_OUTOF10: 3

## 2019-06-24 ASSESSMENT — PAIN DESCRIPTION - LOCATION: LOCATION: BACK

## 2019-06-24 ASSESSMENT — PAIN DESCRIPTION - ORIENTATION: ORIENTATION: LOWER

## 2019-06-24 ASSESSMENT — PAIN DESCRIPTION - PAIN TYPE: TYPE: CHRONIC PAIN

## 2019-06-24 NOTE — PROGRESS NOTES
3x 30 sec each   Exercise 5: Quadruped  UEs x15, LEs x15 and combo UE/LE x15  abd bracing-emphasis on controlling his weaker right side  x15 improving satbility on R, but occ CGA x 1 for control -still note weakness on R when lifting LLE. Exercise 6: 3 way hip orange  band x 15 B    Exercise 8: lunge stretch x 3 hold 15 sec B st step , standing hamstring stretch x 3 hold 15 sec B   Exercise 10: Hydrostick 4 directions x15 reps each  B step stance  Exercise 11: hydro chest L-5 x20  Exercise 14: Seated on blue swiss ball x 15 marching, LAQx10 B  cues to slow down and focus on control-weakness in core noted-decreased stability on ball, (NT>sh horiz sh abd x20  green band, Diag x 20 B green band )   Exercise 15: Modified plank on knees and elbows: 2x30 seconds   Exercise 16: Side lying clam shells:DId pruple today with mod slack(DO  green band next visit ):20x  on right side   Exercise 17: Ball squats: abdominal bracing: 15x  Exercise 18: Holdingorange ball x10 lunges B and small squats  no pain -watch knees          Activity Tolerance:  Activity Tolerance: Patient Tolerated treatment well  Activity Tolerance: \"Feels good\" after session. Assessment: Body structures, Functions, Activity limitations: Decreased ROM, Decreased strength, Decreased balance, Increased Pain, Decreased high-level IADLs  Assessment: Pt progressing well with protocol. Denied increased pain after session. Pt guarded and mindful of transfers. Monitor response to session. Prognosis: Excellent  REQUIRES PT FOLLOW UP: Yes  Discharge Recommendations: Continue to assess pending progress    Patient Education:  Patient Education: Continue HEP , monitor response from today's session. Abd bracing                      Plan:  Times per week: 3  Plan weeks: 8  Specific instructions for Next Treatment: No excessive double knee to chest, lumbar fusion. No lifting >10 lbs, no trunk rotation.  Review exercises from HEP packet, Hip flexibility (hip flexor supine off edge, piriformis, hamstring), Core abdominal bracing, leg raises x3, bridges, Nautilus equipment  Current Treatment Recommendations: Strengthening, ROM, Balance Training, Stair training, Gait Training, Manual Therapy - Joint Manipulation, Manual Therapy - Soft Tissue Mobilization, Modalities, Home Exercise Program    Goals:  Patient goals : Return to prior level working 12 hours     Short term goals  Time Frame for Short term goals: 4 weeks   Short term goal 1: Patient will report ability to sit one hour at work without increased stiffness 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 - Not Met 6/20/19 - tolerates about 30 minutes sitting before needing to stand and walk   Short term goal 2: Patient will increase BLE flexibility to 0-90 deg hamstring, and piriformis leg crossed knee to chest 0-120 deg, hip flexor 0-15 deg extension off edge in order to decrease strain on lumbar spine with bending. - Not Met 6/20/19 - 0-75 deg hamstring, piriformis WFL and hip flexor off edge 0-15 deg. Short term goal 3: Patient will demonstrate good abdominal bracing with SLR x3 directions in order to stabilize lumbar spine with transfers and lifting. - Goal Met 6/20/19 - demonstrates good abdominal bracing with leg raises, no increased pain  Short term goal 4: Patient will be IND with HEP in order to meet long term goals. - Goal Met 6/20/19 - uses orthoneuro packet, CONTINUE GOAL for added strengthening. Long term goals  Time Frame for Long term goals : 8 weeks  Long term goal 1: Patient will improve score of Oswestry LBP disability questionnaire from baseline 33% impairment to less than 15% in order to tolerate work activities. - Not Met 6/20/19 - score 29% impairment   Long term goal 2: Patient will squat to lift 25# box from floor to standing height using good lifting mechanics in order to perform household tasks and lift items at work.  - Not Met 6/20/19 - progress made - full squat ROM,, able to lift 10# box but mild soreness. also 1/2 kneeling to standing lift tolerates well. Long term goal 3: Patient will report ability to tolerate full 12 hour shift without any onset of LLE symptoms or back pain greater than 3/10. - Goal Met 6/20/19 - occasional left thigh numbness, intermittent R posterior knee burning. 12 hour shift with no more than 3/10 pain. Long term goal 4: Patient will increase BLE strength to 5/5 for hips, knees, and ankles in order to squat, lunge, and climb steps without UE support. - Not formally tested - strength progressing  Long term goal 5: Patient will balance in either SLS x15 sec no LOB in order to climb on and off forklift, climb stairs safely. - Goal Met 6/20/19 - balancing 15 sec either foot, climbing stairs without difficulty.      Randi Sanchez  [id number]

## 2019-06-25 ENCOUNTER — HOSPITAL ENCOUNTER (OUTPATIENT)
Dept: PHYSICAL THERAPY | Age: 59
Setting detail: THERAPIES SERIES
Discharge: HOME | End: 2019-06-25
Payer: COMMERCIAL

## 2019-06-25 PROCEDURE — 97110 THERAPEUTIC EXERCISES: CPT

## 2019-06-25 NOTE — PROGRESS NOTES
[place]     Time In: 0795  Time Out: 9319  Minutes: 50  Timed Code Treatment Minutes: 50 Minutes                Date: 2019  Patient Name: Francia Interiano,  Gender:  male        CSN: [id number]   : 1960  (60 y.o.)  Referral Date : 19    Referring Practitioner: Dandy Rosa DO      Diagnosis: M54.16 (ICD-10-CM) - Radiculopathy, lumbar region  Treatment Diagnosis: Lumbago, spinal stiffness, B hip stiffness, muscular weakness    Additional Pertinent Hx: Lumbar fusion L4/L5 , and again 2019 due to loose hardware. General:  PT Visit Information  Onset Date: 19  PT Insurance Information: [employer] - APPROVED FOR A TOTAL OF 18 VISITS FROM 19 TO 19, 3 TIMES A WEEK FOR 6 WEEKS. Modalities covered, no aquatic, no ionto  Total # of Visits Approved: 18  Total # of Visits to Date: 15  Plan of Care/Certification Expiration Date: 19  Progress Note Counter: 3/10 for PN                Subjective:  Chart Reviewed: Yes  Patient assessed for rehabilitation services?: Yes  Response To Previous Treatment: Patient with no complaints from previous session. Family / Caregiver Present: No  Comments: Physician follow up:  Dr. Santiago Gannon: Patient stil having Left anterior thigh numbness, especially with wearing back brace. R knee posterior burning pain. Tightness across lower back. Getting some leg cramping from cholestrol medication.       Pain:  Patient Currently in Pain: Denies         Objective    Exercises  Exercise 1: Sports Bike L-2 x 5 min abd bracing and posture   Exercise 2: Stretches -on MHP>  Piriformis leg crossed and pulling knee towards him, and SKTC x3 B hold 15 sec, hamstring with strap, hip flexor off edge of plinth 3x 15 sec   Exercise 3: Bridge with purple band around knees to engage glute med: 15x 5 sec  Exercise 4: Standing gastroc

## 2019-06-28 ENCOUNTER — HOSPITAL ENCOUNTER (OUTPATIENT)
Dept: PHYSICAL THERAPY | Age: 59
Setting detail: THERAPIES SERIES
Discharge: HOME | End: 2019-06-28
Payer: COMMERCIAL

## 2019-06-28 PROCEDURE — 97110 THERAPEUTIC EXERCISES: CPT

## 2019-06-28 ASSESSMENT — PAIN SCALES - GENERAL: PAINLEVEL_OUTOF10: 2

## 2019-06-28 ASSESSMENT — PAIN DESCRIPTION - LOCATION: LOCATION: BACK

## 2019-06-28 ASSESSMENT — PAIN DESCRIPTION - PAIN TYPE: TYPE: CHRONIC PAIN

## 2019-06-28 NOTE — PROGRESS NOTES
each   Exercise 5: Quadruped  UEs x15, LEs x15 and combo UE/LE x15  abd bracing-emphasis on controlling his weaker right side  x15 improving satbility on R, but occ CGA x 1 for control -still note weakness on R when lifting LLE. Exercise 6: 3 way hip increased to green today x 10 B    Exercise 7:    Exercise 8: lunge stretch x 3 hold 15 sec B st step , standing hamstring stretch x 3 hold 15 sec B   Exercise 9:    Exercise 10: Hydrostick 4 directions x15 reps each  B step stance  Exercise 11: hydro chest L-5 x30   Exercise 13: Step ups each leg CC x 10 fwd 6'' abd braicng  Exercise 15: Modified plank on knees and elbows: 2x30 seconds   Exercise 16: Side lying clam shells Green band x20  Exercise 17: Ball squats: abdominal bracinx  Exercise 18: Holding orange ball x20 lunges B and small squats  no pain          Activity Tolerance:  Activity Tolerance: Patient Tolerated treatment well  Activity Tolerance: Heat feels good at end. Assessment: Body structures, Functions, Activity limitations: Decreased ROM, Decreased strength, Decreased balance, Increased Pain, Decreased high-level IADLs  Assessment: Pt \"stiff-just a little stiff and sore. \" Pt's biddest complaint is \"stiffness. \"  Pt has inground pool. Discussed trial of hanging with noodle vertical or slight hip/knee flex, if vertical aggravates. Monitor pain and inform next therapist.  No increased pain after session stated. Prognosis: Excellent  REQUIRES PT FOLLOW UP: Yes  Discharge Recommendations: Continue to assess pending progress    Patient Education:  Patient Education: Continue HEP , monitor response from today's session. Abd bracing, trial hanging in pool and monitor pain                      Plan:  Times per week: 3  Plan weeks: 8  Specific instructions for Next Treatment: No excessive double knee to chest, lumbar fusion. No lifting >10 lbs, no trunk rotation.  Review exercises from HEP packet, Hip flexibility (hip flexor supine off edge, piriformis, hamstring), Core abdominal bracing, leg raises x3, bridges, Nautilus equipment  Current Treatment Recommendations: Strengthening, ROM, Balance Training, Stair training, Gait Training, Manual Therapy - Joint Manipulation, Manual Therapy - Soft Tissue Mobilization, Modalities, Home Exercise Program    Goals:  Patient goals : Return to prior level working 12 hours     Short term goals  Time Frame for Short term goals: 4 weeks   Short term goal 1: Patient will report ability to sit one hour at work without increased stiffness 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 Short term goal 2: Patient will increase BLE flexibility to 0-90 deg hamstring, and piriformis leg crossed knee to chest 0-120 deg, hip flexor 0-15 deg extension off edge in order to decrease strain on lumbar spine with bending. Short term goal 4: Patient will be IND with HEP in order to meet long term goals. - Goal Met 6/20/19 - uses orthoneuro packet, CONTINUE GOAL for added strengthening. Long term goals  Time Frame for Long term goals : 8 weeks  Long term goal 1: Patient will improve score of Oswestry LBP disability questionnaire from baseline 33% impairment to less than 15% in order to tolerate work activities. Long term goal 2: Patient will squat to lift 25# box from floor to standing height using good lifting mechanics in order to perform household tasks and lift items at work. Long term goal 4: Patient will increase BLE strength to 5/5 for hips, knees, and ankles in order to squat, lunge, and climb steps without UE support.      Loyda Vasquez  [id number]

## 2019-07-01 ENCOUNTER — HOSPITAL ENCOUNTER (OUTPATIENT)
Dept: PHYSICAL THERAPY | Age: 59
Setting detail: THERAPIES SERIES
Discharge: HOME | End: 2019-07-01
Payer: COMMERCIAL

## 2019-07-01 PROCEDURE — 97110 THERAPEUTIC EXERCISES: CPT

## 2019-07-01 ASSESSMENT — PAIN DESCRIPTION - ORIENTATION: ORIENTATION: LOWER

## 2019-07-01 ASSESSMENT — PAIN DESCRIPTION - LOCATION: LOCATION: BACK

## 2019-07-01 ASSESSMENT — PAIN SCALES - GENERAL: PAINLEVEL_OUTOF10: 2

## 2019-07-01 ASSESSMENT — PAIN DESCRIPTION - PAIN TYPE: TYPE: CHRONIC PAIN

## 2019-07-01 NOTE — PROGRESS NOTES
propped on Red stability ball 20x 5 sec   Exercise 5: Quadruped over Red stability ball  - UE/LE, firehydrant x20 each (noted pelvis/SI popping with addition of firehydrant today, denies radicular leg pain with this)   Exercise 9:    Exercise 13: Eccentric lowering 4 inch step no UE support 2 sets of 10 reps each LE  Exercise 14: Seated on blue swiss ball x 20 marching, LAQ x20 B,  Row x20, horiz abd x20, Ext x20 Blue band   Exercise 15: Plank on feet and elbows: 3x 15 sec   Exercise 16: Side lying clam shells Green band x20   Exercise 17: Ball against wall: squats with abdominal bracinx, marching x20  Exercise 18: Holding Blue ball x20 lunges B (not today - squats holding ball)  Exercise 19: Inverted BOSU squat x30 reps - some quad fatigue noted, no pain         Activity Tolerance:  Activity Tolerance: Patient Tolerated treatment well  Activity Tolerance: Pain increased from 2 to 3/10 at end of session, mostly lower back. Assessment: Body structures, Functions, Activity limitations: Decreased ROM, Decreased strength, Decreased balance, Increased Pain, Decreased high-level IADLs  Assessment: Made several progressions in strength and stabilization program today. One episode of \"popping\" in lower back during fire hydrant exercise, no significant increase in pain following session, but advised that he may feel soreness. Prognosis: Excellent  REQUIRES PT FOLLOW UP: Yes  Discharge Recommendations: Continue to assess pending progress    Patient Education:  Patient Education: Continue HEP , monitor response from today's session. Abd bracing, trial hanging in pool and monitor pain                      Plan:  Times per week: 3  Plan weeks: 8  Specific instructions for Next Treatment: No excessive double knee to chest, lumbar fusion. No lifting >10 lbs, no trunk rotation.  Review exercises from HEP packet, Hip flexibility (hip flexor supine off edge, piriformis, hamstring), Core abdominal bracing, leg raises x3,

## 2019-07-03 ENCOUNTER — HOSPITAL ENCOUNTER (OUTPATIENT)
Dept: PHYSICAL THERAPY | Age: 59
Setting detail: THERAPIES SERIES
Discharge: HOME | End: 2019-07-03
Payer: COMMERCIAL

## 2019-07-03 PROCEDURE — 97110 THERAPEUTIC EXERCISES: CPT

## 2019-07-03 ASSESSMENT — PAIN DESCRIPTION - PAIN TYPE: TYPE: CHRONIC PAIN

## 2019-07-03 ASSESSMENT — PAIN SCALES - GENERAL: PAINLEVEL_OUTOF10: 3

## 2019-07-03 ASSESSMENT — PAIN DESCRIPTION - LOCATION: LOCATION: BACK

## 2019-07-03 ASSESSMENT — PAIN DESCRIPTION - ORIENTATION: ORIENTATION: LOWER

## 2019-07-03 NOTE — PROGRESS NOTES
5: Quadruped over Red stability ball  - UE/LE, x 20 NT due to increased discomfort after last session>  firehydrant x20 each (noted pelvis/SI popping with addition of firehydrant today, denies radicular leg pain with this)     Exercise 13: Eccentric lowering 4 inch step no UE support 2 sets of 10 reps each LE  Exercise 14: Seated on blue swiss ball x 20 marching, LAQ x20 B,  Row x20, horiz abd x20, Ext x20 Blue band   Exercise 15: Plank on feet and elbows: 3x 15 sec   Exercise 16: Side lying clam shells Green band x20   Exercise 17: Ball against wall: squats with abdominal bracinx, marching x20  Exercise 18: Holding red ball x20 lunges B (not today - squats holding ball)  Exercise 19: Inverted BOSU squat x30 reps - some quad fatigue noted, no pain         Activity Tolerance:  Activity Tolerance: Patient Tolerated treatment well  Activity Tolerance: No increased pain after session. Assessment: Body structures, Functions, Activity limitations: Decreased ROM, Decreased strength, Decreased balance, Increased Pain, Decreased high-level IADLs  Assessment: Held firehydrant, due to increased \"aggravation\" after last session. No popping or increased pain today \"Feels alright. \"   Prognosis: Excellent  REQUIRES PT FOLLOW UP: Yes  Discharge Recommendations: Continue to assess pending progress    Patient Education:  Patient Education: Continue HEP , monitor response from today's session. Abd bracing, Monitor hanging in pool -try over days off and monitor symptoms                      Plan:  Times per week: 3  Plan weeks: 8  Specific instructions for Next Treatment: No excessive double knee to chest, lumbar fusion. No lifting >10 lbs, no trunk rotation.  Review exercises from HEP packet, Hip flexibility (hip flexor supine off edge, piriformis, hamstring), Core abdominal bracing, leg raises x3, bridges, Nautilus equipment  Current Treatment Recommendations: Strengthening, ROM, Balance Training, Stair training, Gait Training, Manual Therapy - Joint Manipulation, Manual Therapy - Soft Tissue Mobilization, Modalities, Home Exercise Program    Goals:  Patient goals : Return to prior level working 12 hours     Short term goals  Time Frame for Short term goals: 4 weeks   Short term goal 1: Patient will report ability to sit one hour at work without increased stiffness 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 Short term goal 2: Patient will increase BLE flexibility to 0-90 deg hamstring, and piriformis leg crossed knee to chest 0-120 deg, hip flexor 0-15 deg extension off edge in order to decrease strain on lumbar spine with bending. Short term goal 4: Patient will be IND with HEP in order to meet long term goals. - Goal Met 6/20/19 - uses orthoneuro packet, CONTINUE GOAL for added strengthening. Long term goals  Time Frame for Long term goals : 8 weeks  Long term goal 1: Patient will improve score of Oswestry LBP disability questionnaire from baseline 33% impairment to less than 15% in order to tolerate work activities. Long term goal 2: Patient will squat to lift 25# box from floor to standing height using good lifting mechanics in order to perform household tasks and lift items at work. Long term goal 4: Patient will increase BLE strength to 5/5 for hips, knees, and ankles in order to squat, lunge, and climb steps without UE support.      Ann[facility]  [id number]

## 2019-07-09 ENCOUNTER — HOSPITAL ENCOUNTER (OUTPATIENT)
Dept: PHYSICAL THERAPY | Age: 59
Setting detail: THERAPIES SERIES
Discharge: HOME | End: 2019-07-09
Payer: COMMERCIAL

## 2019-07-09 PROCEDURE — 97110 THERAPEUTIC EXERCISES: CPT

## 2019-07-09 NOTE — PROGRESS NOTES
goals  Time Frame for Long term goals : 8 weeks  Long term goal 1: Patient will improve score of Oswestry LBP disability questionnaire from baseline 33% impairment to less than 15% in order to tolerate work activities. Long term goal 2: Patient will squat to lift 25# box from floor to standing height using good lifting mechanics in order to perform household tasks and lift items at work. - Goal Met 7/9/19 - able to lift 25# box with good squat technique, able to verbalize good lifting mechanics   Long term goal 3: Patient will report ability to tolerate full 12 hour shift without any onset of LLE symptoms or back pain greater than 3/10. - Goal Met 6/20/19 - occasional left thigh numbness, intermittent R posterior knee burning. 12 hour shift with no more than 3/10 pain. Long term goal 4: Patient will increase BLE strength to 5/5 for hips, knees, and ankles in order to squat, lunge, and climb steps without UE support. - Goal met 7/9/19 - 5/5 knee ext, flexion, ankle DF. Hip abd and add 5/5, Left hip flexion 4+/5  Long term goal 5: Patient will balance in either SLS x15 sec no LOB in order to climb on and off forklift, climb stairs safely. - Goal Met 6/20/19 - balancing 15 sec either foot, climbing stairs without difficulty.      Vladimir David, PT

## 2019-07-11 ENCOUNTER — HOSPITAL ENCOUNTER (OUTPATIENT)
Dept: PHYSICAL THERAPY | Age: 59
Setting detail: THERAPIES SERIES
Discharge: HOME | End: 2019-07-11
Payer: COMMERCIAL

## 2019-07-11 PROCEDURE — 97110 THERAPEUTIC EXERCISES: CPT

## 2019-07-11 NOTE — DISCHARGE SUMMARY
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 - Not Met 7/9/19 - takes standing breaks every 30 mins at work to avoid stiffness. Short term goal 2: Patient will increase BLE flexibility to 0-90 deg hamstring, and piriformis leg crossed knee to chest 0-120 deg, hip flexor 0-15 deg extension off edge in order to decrease strain on lumbar spine with bending. - Goal Met 7/9/19 - hamstrings 0-85 deg left, 0-90 deg right, piriformis and hip flexor stretching WFL no pain  Short term goal 3: Patient will demonstrate good abdominal bracing with SLR x3 directions in order to stabilize lumbar spine with transfers and lifting. - Goal Met 6/20/19 - demonstrates good abdominal bracing with leg raises, no increased pain  Short term goal 4: Patient will be IND with HEP in order to meet long term goals. - Goal Met 6/20/19 - uses orthoneuro packet. Long term goals  Time Frame for Long term goals : 8 weeks  Long term goal 1: Patient will improve score of Oswestry LBP disability questionnaire from baseline 33% impairment to less than 15% in order to tolerate work activities. - Not Met 7/11/19 - minimal change, 27% impairment today. Primarily limited by lifting restriction 10 lb limit. Long term goal 2: Patient will squat to lift 25# box from floor to standing height using good lifting mechanics in order to perform household tasks and lift items at work. - Goal Met 7/9/19 - able to lift 25# box with good squat technique, able to verbalize good lifting mechanics   Long term goal 3: Patient will report ability to tolerate full 12 hour shift without any onset of LLE symptoms or back pain greater than 3/10. - Goal Met 6/20/19 - occasional left thigh numbness, intermittent R posterior knee burning.  12 hour shift with no more than 3/10 pain. Long term goal 4: Patient will increase BLE strength to 5/5 for hips, knees, and ankles in order to squat, lunge, and climb steps without UE support. - Goal met 7/9/19 - 5/5 knee ext, flexion, ankle DF. Hip abd and add 5/5, Left hip flexion 4+/5  Long term goal 5: Patient will balance in either SLS x15 sec no LOB in order to climb on and off forklift, climb stairs safely. - Goal Met 6/20/19 - balancing 15 sec either foot, climbing stairs without difficulty.      Zack Browning, PT

## 2019-11-07 ENCOUNTER — TELEPHONE (OUTPATIENT)
Dept: FAMILY MEDICINE CLINIC | Age: 59
End: 2019-11-07

## 2019-11-08 RX ORDER — BENZONATATE 200 MG/1
200 CAPSULE ORAL 3 TIMES DAILY PRN
Qty: 30 CAPSULE | Refills: 0 | Status: SHIPPED | OUTPATIENT
Start: 2019-11-08 | End: 2019-11-15

## 2019-11-08 RX ORDER — CEPHALEXIN 500 MG/1
500 CAPSULE ORAL 3 TIMES DAILY
Qty: 30 CAPSULE | Refills: 0 | Status: SHIPPED | OUTPATIENT
Start: 2019-11-08 | End: 2019-11-18

## 2019-12-03 ENCOUNTER — HOSPITAL ENCOUNTER (OUTPATIENT)
Age: 59
Discharge: HOME | End: 2019-12-03

## 2019-12-03 DIAGNOSIS — Z00.00 PHYSICAL EXAM, ANNUAL: Primary | ICD-10-CM

## 2019-12-03 DIAGNOSIS — Z00.00 PHYSICAL EXAM, ANNUAL: ICD-10-CM

## 2019-12-03 LAB
EKG ATRIAL RATE: 57 BPM
EKG P AXIS: 59 DEGREES
EKG P-R INTERVAL: 148 MS
EKG Q-T INTERVAL: 408 MS
EKG QRS DURATION: 100 MS
EKG QTC CALCULATION (BAZETT): 397 MS
EKG R AXIS: 10 DEGREES
EKG T AXIS: 27 DEGREES
EKG VENTRICULAR RATE: 57 BPM

## 2020-01-21 ENCOUNTER — HOSPITAL ENCOUNTER (OUTPATIENT)
Dept: AUDIOLOGY | Age: 60
Discharge: HOME | End: 2020-01-21
Payer: COMMERCIAL

## 2020-01-21 PROCEDURE — 92557 COMPREHENSIVE HEARING TEST: CPT | Performed by: AUDIOLOGIST

## 2020-01-21 PROCEDURE — 92567 TYMPANOMETRY: CPT | Performed by: AUDIOLOGIST

## 2020-01-21 NOTE — PROGRESS NOTES
AUDIOLOGICAL EVALUATION    REASON FOR TESTING:  Patient referred for audiological evaluation by employer, [facility] [employer] [facility] [employer]ties, due to [employer] (STS), relative to revised baseline audiogram from 2014. Mr. Kenny Mendoza reports occupational and  related noise exposure. He wears hearing protection devices while at work. The patient explains that he has worn hearing aids for many years. He has not been satisfied with the sound quality of the most recent pair of hearing aids received at the [facility]. He experiences bilateral tinnitus and denies any vertigo. OTOSCOPY: WNL for both ears. AUDIOGRAM            Reliability: Good  Audiometer Used:  GSI-61    SPEECH AUDIOMETRY   Right Left OSHA (2,3,4 KHz)  Right ear OSHA  (2,3,4 KHz) Left ear   PTA 17 25 52 62   SRT 10 15     SAT       MASKING       % WRS   QUIET 92% 92%      40 dBSL 45 dBSL     %WRS   NOISE              MCL 50 dBHL 60 dBHL     UCL            Live Voice  [x]     Recorded  []     List   []     TYMPANOGRAMS  RE    LE  [x]   [x]  WNL    []   []  WNL w/reduced mobility  []   [] WNL w/hyper mobility  []   [] Negative pressure  []   [] Flat w/normal ECV  []   [] Flat w/large ECV  []   [] Patent PE tube  []   [] Non-Patent PE tube  []   [] Could Not Test    COMMENTS: Audiometry revealed normal hearing sensitivity for both ears, sloping to moderately-severe high frequency sensorineural hearing loss for the right ear and sloping to severe high frequency sensorineural hearing loss for the left ear. The pure tone average at 2, 3, 4 KHz, per OSHA regulations, is 52 dBHL for the right ear and 62 dBHL for the left ear. When these thresholds are compared to the age corrected baseline from 2014, a Standard Threshold Shift, is confirmed for the left ear.       RECOMMENDATION(S):   1- ENT consult and further testing, such as imaging, is recommended due to the asymmetrical sensorineural hearing loss in order to rule out a retrocochlear lesion. 2- Continue use of binaural amplification. The patient may benefit from returning to the [address] [place] [address] for hearing aid adjustments. 3- Continue to utilize hearing protection devices in the presence of loud noise, both at home and at work. 4- Annual audiometry for monitoring purposes.

## 2020-03-27 ENCOUNTER — TELEPHONE (OUTPATIENT)
Dept: FAMILY MEDICINE CLINIC | Age: 60
End: 2020-03-27

## 2020-03-27 NOTE — TELEPHONE ENCOUNTER
DOLV=10-29-18. Rachel Sicnlair is calling to make an appt for nasal congestion and burning inside the nose, he using an OTC nasal spray, but bottle says not to use more than 3 days, it works but concerned about bottle saying not to use longer than 3 days. What to do? He uses [facility], and is NK[facility]. My chart inactive.

## 2020-04-03 ENCOUNTER — TELEPHONE (OUTPATIENT)
Dept: FAMILY MEDICINE CLINIC | Age: 60
End: 2020-04-03

## 2020-04-03 NOTE — TELEPHONE ENCOUNTER
Patient called and stated he was on an antibiotic a couple weeks ago, last week he was advised to use Flonase which helped with the congestion and burning in nose. Now he is having a tightness in his throat below his massimo's apple. No fever, runny nose or cough. He started using Mucinex which he stated he will continue throughout the weekend. Please advise.

## 2020-04-03 NOTE — TELEPHONE ENCOUNTER
Patient states he has no ST or shortness of breath. He notices the tightness and discomfort about 1 week ago. He states it is only harder to breathe when he looks down. He states other than that it is not difficult to swallow or breathe. No other sx's.

## 2020-04-09 ENCOUNTER — TELEPHONE (OUTPATIENT)
Dept: FAMILY MEDICINE CLINIC | Age: 60
End: 2020-04-09

## 2020-04-21 ENCOUNTER — OFFICE VISIT (OUTPATIENT)
Dept: PRIMARY CARE CLINIC | Age: 60
End: 2020-04-21
Payer: COMMERCIAL

## 2020-04-21 VITALS
OXYGEN SATURATION: 98 % | DIASTOLIC BLOOD PRESSURE: 80 MMHG | BODY MASS INDEX: 25.54 KG/M2 | WEIGHT: 199 LBS | TEMPERATURE: 97.9 F | RESPIRATION RATE: 18 BRPM | HEART RATE: 67 BPM | HEIGHT: 74 IN | SYSTOLIC BLOOD PRESSURE: 138 MMHG

## 2020-04-21 PROCEDURE — 99213 OFFICE O/P EST LOW 20 MIN: CPT | Performed by: FAMILY MEDICINE

## 2020-04-21 RX ORDER — OMEPRAZOLE 20 MG/1
20 CAPSULE, DELAYED RELEASE ORAL
Qty: 30 CAPSULE | Refills: 0 | Status: SHIPPED | OUTPATIENT
Start: 2020-04-21 | End: 2020-05-12

## 2020-04-21 RX ORDER — FLUTICASONE PROPIONATE 50 MCG
2 SPRAY, SUSPENSION (ML) NASAL DAILY
COMMUNITY
End: 2020-10-27 | Stop reason: ALTCHOICE

## 2020-04-21 ASSESSMENT — ENCOUNTER SYMPTOMS
SORE THROAT: 0
DIARRHEA: 0
CHEST TIGHTNESS: 0
EYE PAIN: 0
BACK PAIN: 0
CONSTIPATION: 0
VOMITING: 0
ABDOMINAL PAIN: 0
SHORTNESS OF BREATH: 0
BLOOD IN STOOL: 0
COUGH: 0
WHEEZING: 0
RHINORRHEA: 0
NAUSEA: 0

## 2020-04-21 ASSESSMENT — PATIENT HEALTH QUESTIONNAIRE - PHQ9
2. FEELING DOWN, DEPRESSED OR HOPELESS: 0
SUM OF ALL RESPONSES TO PHQ QUESTIONS 1-9: 0
SUM OF ALL RESPONSES TO PHQ9 QUESTIONS 1 & 2: 0
1. LITTLE INTEREST OR PLEASURE IN DOING THINGS: 0
SUM OF ALL RESPONSES TO PHQ QUESTIONS 1-9: 0

## 2020-04-22 ENCOUNTER — TELEPHONE (OUTPATIENT)
Dept: FAMILY MEDICINE CLINIC | Age: 60
End: 2020-04-22

## 2020-05-08 ENCOUNTER — TELEPHONE (OUTPATIENT)
Dept: FAMILY MEDICINE CLINIC | Age: 60
End: 2020-05-08

## 2020-05-08 NOTE — TELEPHONE ENCOUNTER
DOLV=10-29-18. Dr Elsa Timmons ortho neuro in [place], is calling to see if Dr Chase Brothers would order his pre op testing, ortho neuro is faxing the testing that needs done. Zenaida Watkins also needs a pre op phys, he is having his surgery 05-28, R hallux MTP debridement.

## 2020-05-12 RX ORDER — OMEPRAZOLE 20 MG/1
CAPSULE, DELAYED RELEASE ORAL
Qty: 30 CAPSULE | Refills: 0 | Status: SHIPPED | OUTPATIENT
Start: 2020-05-12 | End: 2020-10-27 | Stop reason: ALTCHOICE

## 2020-06-26 ENCOUNTER — HOSPITAL ENCOUNTER (OUTPATIENT)
Dept: PHYSICAL THERAPY | Age: 60
Setting detail: THERAPIES SERIES
Discharge: HOME | End: 2020-06-26
Payer: COMMERCIAL

## 2020-06-26 PROCEDURE — 97110 THERAPEUTIC EXERCISES: CPT

## 2020-06-26 PROCEDURE — 97161 PT EVAL LOW COMPLEX 20 MIN: CPT

## 2020-06-26 NOTE — PROGRESS NOTES
** PLEASE SIGN, DATE AND TIME CERTIFICATION BELOW AND RETURN TO [facility] OUTPATIENT REHABILITATION (FAX #: [phone]). ATTEST/CO-SIGN IF ACCESSING VIA [employer]. THANK YOU.**    I certify that I have examined the patient below and determined that Physical Medicine and Rehabilitation service is necessary and that I approve the established plan of care for up to 90 days or as specifically noted. Attestation, signature or co-signature of physician indicates approval of certification requirements.    ________________________ ____________ __________  Physician Signature   Date   Time  [facility]  [x] EVALUATION  [] DAILY NOTE [] PROGRESS NOTE [] DISCHARGE NOTE    [x] OUTPATIENT REHABILITATION CENTER [facility]   [] [place] [address]    [] [facility]   [] [facility]    Date: 2020  Patient Name:  Jolie Rivas  : 1960  MRN: [id number]    Referring Practitioner Ayan Smith DO   Diagnosis Unilateral primary osteoarthritis, left knee [M17.12]  Pain in left knee [M25.562]  Sprain of unspecified site of unspecified knee, initial encounter [S83.90XA]    Treatment Diagnosis S/p left TKR, difficulty walking, knee pain   Date of Evaluation 2020   Additional Pertinent History Left TKR 2020, 2 back surgeries lower lumbar with fusion, right great toe surgery 2020,       Functional Outcome Measure Used LE Functional Index   Functional Outcome Score 18/80      Insurance: Payor: R /  /  / ,     Authorization Information: 100 visits per calendar year combined for PT, OT, ST. No precertification. Aquatic yes, modalities yes   Visit # 1, 1/10 for progress note   Visits Allowed:    Recertification Date:    Physician Follow-Up: 2020     PT evaluate and treat, ex for ROM, strength, aquatic, gait analysis, home instructions, estim, cryotherapy, modalities as indicated. 3x per week for 6 weeks.    SUBJECTIVE: Patient

## 2020-06-29 ENCOUNTER — HOSPITAL ENCOUNTER (OUTPATIENT)
Dept: PHYSICAL THERAPY | Age: 60
Setting detail: THERAPIES SERIES
Discharge: HOME | End: 2020-06-29
Payer: COMMERCIAL

## 2020-06-29 PROCEDURE — 97110 THERAPEUTIC EXERCISES: CPT

## 2020-06-29 NOTE — PROGRESS NOTES
10x X alternating   Active assistive with belt heelslides 1 set 10x  X    SAQs  1 set 10x X Last 5 with min A for full ROM   SLR  2 5x X    Hip abduction on  1 10x X    Seated knee flexion and LAQ 1 X10 each X    NuStep    X Seat 13/arm 14 Level 2 x 5 minutes    Step stretches 15-20 seconds  X 3 X Knee flexion, hamstring and calf    Dangle off edge of mat 2 min 1 X For knee flexion   Knee ROM   X Lacking 4 degrees to 85 degrees of flexion     Specific Interventions Next Treatment: see above for specific interventions to be completed during next/subsequent treatment sessions. Activity/Treatment Tolerance:  [x]  Patient tolerated treatment well  []  Patient limited by fatigue  []  Patient limited by pain   []  Patient limited by medical complications  []  Other:     Assessment:Patient reporting pain level 5/10 at end of session and tolerating session well. Patient did have noted weakness on L LE and limited range of motion. Prognosis: good    GOALS:  Patient Goal: To have less knee pain, walk without walker and return to driving and work    Short Term Goal Time Frame: 4 weeks  Short Term Goals:  1. Patient to report of decreased pain levels 5/10 to no greater than 2/10 with standing and walking. 2. Patient to demonstrate increased left knee ROM: from 6 degrees to 83 degrees to 0 degrees to 110 degrees knee flexion with increased ease with transfers, walking and negotiating steps. 3. Patient to demonstrate increased strength LLE at hip and knee musculature. Hip flexion 4/5, knee 4-/5.  4. Patient to ambulate from roller walker to straight cane with equal wt shift, knee flexion through swing phase and heelstrike to foot flat. For community distances. 5. Patient to negotiated 4 steps with single handrail reciprocally in order to negotiated stair steps at home. Long Term Goal Time Frame: 8 weeks  1.  Patient to demonstrate knee ROM 0-110 degrees to 0 degrees to 120 degrees flexion with increased ease with stair steps reciprocally, curbs and getting in and out of car. 2.Patient to ambulate without assistive device for limited community distances and household distances with normal gait pattern. LE functional index 18/80 to 50/80.   3 Patient independent in HEP with progression in strength, ROM, gait and functional activities. Patient Education:   [x]  HEP/Education Completed: continue with HEP and ice. []  No new Education completed  []  Reviewed Prior HEP      [x]  Patient verbalized and/or demonstrated understanding of education provided. []  Patient unable to verbalize and/or demonstrate understanding of education provided. Will continue education. PLAN:  Treatment Recommendations: Strengthening, Range of Motion, Balance Training, Functional Mobility Training, Transfer Training, Gait Training, Stair Training, Manual Therapy - Joint Manipulation, Pain Management, Home Exercise Program, Patient Education, Safety Education and Training and Self-Care Education and Training    [x]  Plan of care initiated. Plan to see patient 3 times per week for 8 weeks to address the treatment planned outlined above.   [x]  Continue with current plan of care  []  Modify plan of care as follows:    []  Hold pending physician visit  []  Discharge    Time In 0833   Time Out 0908       Timed Code Minutes: Minutes Units   ADL (54212)     Aquatics (03697)     Gait (06736)     Manual Therapy (51019)     Massage (74058)     Neuro (70550)     Th. Activities (77769)     Th. Exercise (69220) 35 2   Ultrasound (30743)     Ionto (11613)     Manual E-Stim (13946)          TOTAL  TREATMENT TIME: 35 min       Electronically Signed by: Otoniel Baeza [id number] PTA

## 2020-06-30 ENCOUNTER — HOSPITAL ENCOUNTER (OUTPATIENT)
Dept: PHYSICAL THERAPY | Age: 60
Setting detail: THERAPIES SERIES
Discharge: HOME | End: 2020-06-30
Payer: COMMERCIAL

## 2020-06-30 PROCEDURE — 97110 THERAPEUTIC EXERCISES: CPT

## 2020-06-30 NOTE — PROGRESS NOTES
[address] [facility]  PHYSICAL THERAPY  [] EVALUATION  [x] DAILY NOTE [] PROGRESS NOTE [] DISCHARGE NOTE    [x] OUTPATIENT REHABILITATION CENTER [facility]       Date: 2020  Patient Name:  Ronald Recinos  : 1960  MRN: [id number]    Referring Practitioner Norma Speaker,    Diagnosis Unilateral primary osteoarthritis, left knee [M17.12]  Pain in left knee [M25.562]  Sprain of unspecified site of unspecified knee, initial encounter [S83.90XA]    Treatment Diagnosis S/p left TKR, difficulty walking, knee pain   Date of Evaluation 2020   Additional Pertinent History Left TKR 2020, 2 back surgeries lower lumbar with fusion, right great toe surgery 2020,       Functional Outcome Measure Used LE Functional Index   Functional Outcome Score       Insurance: Payor: [facility] /  /  / ,     Authorization Information: 100 visits per calendar year combined for PT, OT, ST. No precertification. Aquatic yes, modalities yes   Visit # 3, 3/10 for progress note   Visits Allowed:    Recertification Date:    Physician Follow-Up: 2020     PT evaluate and treat, ex for ROM, strength, aquatic, gait analysis, home instructions, estim, cryotherapy, modalities as indicated. 3x per week for 6 weeks. SUBJECTIVE: Pt stated he was able to sleep in bed last night. Pt stated he was in a lot of pain yesturday but felt he may have done to much. Blisters are looking better and pt aired it out yesturday. TREATMENT   Precautions: TKR TK protocol  Continue to use roller walker. Blisters at left medial knee. Pain:  Pain level 5/10    TREATMENT                                  X in shaded column indicates Activity Completed Today   Modalities Parameters/  Location  Notes   Ice pack  Left knee anterior/posterior x 10 minutes.                 Manual Therapy Time/  Technique  Notes                     Exercise/  Intervention Sets/  Sec Reps  Notes   Ankle pumps, quad sets  1 15x X With quad sets may put towel roll under heel/ankle    seated marching 1 10x  alternating   Heel slides 1 10X x    Active assistive with belt heelslides 1 set 10x      SAQs  2 set 5x X    SLR  1 10x X Mild quad lag   Hip abduction  1 10x X    Seated knee flexion and LAQ 1 X10 each     NuStep    X Seat 13/arm 14 Level 2 x 5 minutes    Step stretches 15-20 seconds  X 3 X Knee flexion, hamstring and calf    Dangle off edge of mat 2 min 1 X For knee flexion   Knee ROM   X Lacking 3 degrees to 82 degrees of flexion   Standing Heel/toe raises 1 10 x    Standing marching 1 10 x    Standing mini squats 1 10 x      Specific Interventions Next Treatment: see above for specific interventions to be completed during next/subsequent treatment sessions. Activity/Treatment Tolerance:  [x]  Patient tolerated treatment well  []  Patient limited by fatigue  []  Patient limited by pain   []  Patient limited by medical complications  []  Other:     Assessment: Pt continues to work on strengthening and ROM L knee after recent TKR. Pt has mild quad lag with SLR. Good heel strike with ambulation. Focus on SLR and stretches at home. Prognosis: good    GOALS:  Patient Goal: To have less knee pain, walk without walker and return to driving and work    Short Term Goal Time Frame: 4 weeks  Short Term Goals:  1. Patient to report of decreased pain levels 5/10 to no greater than 2/10 with standing and walking. 2. Patient to demonstrate increased left knee ROM: from 6 degrees to 83 degrees to 0 degrees to 110 degrees knee flexion with increased ease with transfers, walking and negotiating steps. 3. Patient to demonstrate increased strength LLE at hip and knee musculature. Hip flexion 4/5, knee 4-/5.  4. Patient to ambulate from roller walker to straight cane with equal wt shift, knee flexion through swing phase and heelstrike to foot flat. For community distances.    5. Patient to negotiated 4 steps with single handrail reciprocally in order to negotiated stair steps at home. Long Term Goal Time Frame: 8 weeks  1. Patient to demonstrate knee ROM 0-110 degrees to 0 degrees to 120 degrees flexion with increased ease with stair steps reciprocally, curbs and getting in and out of car. 2.Patient to ambulate without assistive device for limited community distances and household distances with normal gait pattern. LE functional index 18/80 to 50/80.   3 Patient independent in HEP with progression in strength, ROM, gait and functional activities. Patient Education:   [x]  HEP/Education Completed: continue with HEP and ice. Added standing heel toe raises, marching, mini squats, focus on SLR and stretches  []  No new Education completed  []  Reviewed Prior HEP      [x]  Patient verbalized and/or demonstrated understanding of education provided. []  Patient unable to verbalize and/or demonstrate understanding of education provided. Will continue education. PLAN:  Treatment Recommendations: Strengthening, Range of Motion, Balance Training, Functional Mobility Training, Transfer Training, Gait Training, Stair Training, Manual Therapy - Joint Manipulation, Pain Management, Home Exercise Program, Patient Education, Safety Education and Training and Self-Care Education and Training    [x]  Plan of care initiated. Plan to see patient 3 times per week for 8 weeks to address the treatment planned outlined above.   [x]  Continue with current plan of care  []  Modify plan of care as follows:    []  Hold pending physician visit  []  Discharge    Time In 0845   Time Out 0933       Timed Code Minutes: Minutes Units   ADL (66222)     Aquatics (11210)     Gait (84297)     Manual Therapy (55255)     Massage (75481)     Neuro (69295)     Th. Activities (71183)     Th. Exercise (60643) 38 3   Ultrasound (30183)     Ionto (89583)     Manual E-Stim (03198)          TOTAL  TREATMENT TIME: 48 min       Electronically Signed by: Jennifer Herrera [id number] PTA

## 2020-07-02 ENCOUNTER — HOSPITAL ENCOUNTER (OUTPATIENT)
Dept: PHYSICAL THERAPY | Age: 60
Setting detail: THERAPIES SERIES
Discharge: HOME | End: 2020-07-02
Payer: COMMERCIAL

## 2020-07-02 PROCEDURE — 97110 THERAPEUTIC EXERCISES: CPT

## 2020-07-02 NOTE — PROGRESS NOTES
[address] [facility]  PHYSICAL THERAPY  [] EVALUATION  [x] DAILY NOTE [] PROGRESS NOTE [] DISCHARGE NOTE    [x] [facility] - [place]       Date: 2020  Patient Name:  Óscar Melendez  : 1960  MRN: [id number]    Referring Practitioner Alayna Medrano DO   Diagnosis Unilateral primary osteoarthritis, left knee [M17.12]  Pain in left knee [M25.562]  Sprain of unspecified site of unspecified knee, initial encounter [S83.90XA]    Treatment Diagnosis S/p left TKR, difficulty walking, knee pain   Date of Evaluation 2020   Additional Pertinent History Left TKR 2020, 2 back surgeries lower lumbar with fusion, right great toe surgery 2020,       Functional Outcome Measure Used LE Functional Index   Functional Outcome Score       Insurance: Payor: [facility] /  /  / ,     Authorization Information: 100 visits per calendar year combined for PT, OT, ST. No precertification. Aquatic yes, modalities yes   Visit # 3, 3/10 for progress note   Visits Allowed:    Recertification Date:    Physician Follow-Up: 2020     PT evaluate and treat, ex for ROM, strength, aquatic, gait analysis, home instructions, estim, cryotherapy, modalities as indicated. 3x per week for 6 weeks. SUBJECTIVE: Patient reporting pain level 5/10 today and asking if therapy had more RIANA hose. (hole in hose from ice packs)      TREATMENT   Precautions: TKR TK protocol  Continue to use roller walker. Blisters at left medial knee.     Pain:  Pain level 5/10    TREATMENT                                  X in shaded column indicates Activity Completed Today   Modalities Parameters/  Location  Notes   Ice pack  Left knee anterior/posterior                 Manual Therapy Time/  Technique  Notes                     Exercise/  Intervention Sets/  Sec Reps  Notes   Ankle pumps, quad sets  1 15x x With quad sets may put towel roll under heel/ankle    seated marching and ankle pumps 1 15x x alternating   Heel slides 1 5X x    Active assistive with belt heel slides 1 set 10x  x    SAQ's  1 set 10x X +5 more with assist for carrington ROM   SLR  5 sec 10x x Mild quad lag   Hip abduction  1 10x x    Seated knee flexion and LAQ 1 X10 each     NuStep    x Seat 9/arm 10 Level 2 x 5 minutes    Step stretches 15-20 seconds  X 3 x Knee flexion, hamstring and calf    Dangle off edge of mat 2 min 1 X For knee flexion   Knee ROM   x Lacking 1 degrees to 92 degrees of flexion   Standing Heel/toe raises 1 10 x    Standing marching 1 10 x    Standing mini squats 1 10 x      Specific Interventions Next Treatment: see above for specific interventions to be completed during next/subsequent treatment sessions. Activity/Treatment Tolerance:  [x]  Patient tolerated treatment well  []  Patient limited by fatigue  []  Patient limited by pain   []  Patient limited by medical complications  []  Other:     Assessment: Progressed with reps as noted with patient tolerating well. Patient showing improvement in knee flexion and extension. Patient still having noted thigh weakness and reporting knee feeling stiff but having no other complains at end of session. Prognosis: good    GOALS:  Patient Goal: To have less knee pain, walk without walker and return to driving and work    Short Term Goal Time Frame: 4 weeks  Short Term Goals:  1. Patient to report of decreased pain levels 5/10 to no greater than 2/10 with standing and walking. 2. Patient to demonstrate increased left knee ROM: from 6 degrees to 83 degrees to 0 degrees to 110 degrees knee flexion with increased ease with transfers, walking and negotiating steps. 3. Patient to demonstrate increased strength LLE at hip and knee musculature. Hip flexion 4/5, knee 4-/5.  4. Patient to ambulate from roller walker to straight cane with equal wt shift, knee flexion through swing phase and heel strike to foot flat. For community distances.    5. Patient to negotiated 4 steps with single handrail reciprocally in order to negotiated stair steps at home. Long Term Goal Time Frame: 8 weeks  1. Patient to demonstrate knee ROM 0-110 degrees to 0 degrees to 120 degrees flexion with increased ease with stair steps reciprocally, curbs and getting in and out of car. 2.Patient to ambulate without assistive device for limited community distances and household distances with normal gait pattern. LE functional index 18/80 to 50/80.   3 Patient independent in HEP with progression in strength, ROM, gait and functional activities. Patient Education:   [x]  HEP/Education Completed: continue with HEP and ice. Continued working on knee flexion and hamstring stretches  []  No new Education completed  []  Reviewed Prior HEP      [x]  Patient verbalized and/or demonstrated understanding of education provided. []  Patient unable to verbalize and/or demonstrate understanding of education provided. Will continue education. PLAN:  Treatment Recommendations: Strengthening, Range of Motion, Balance Training, Functional Mobility Training, Transfer Training, Gait Training, Stair Training, Manual Therapy - Joint Manipulation, Pain Management, Home Exercise Program, Patient Education, Safety Education and Training and Self-Care Education and Training    [x]  Plan of care initiated. Plan to see patient 3 times per week for 8 weeks to address the treatment planned outlined above.   [x]  Continue with current plan of care  []  Modify plan of care as follows:    []  Hold pending physician visit  []  Discharge    Time In 0840   Time Out 0918       Timed Code Minutes: Minutes Units   ADL (32022)     Aquatics (12483)     Gait (84023)     Manual Therapy (30072)     Massage (97979)     Neuro (96258)     Th. Activities (72854)     Th. Exercise (51732) 35 2   Ultrasound (15593)     Ionto (97175)     Manual E-Stim (21375)          TOTAL  TREATMENT TIME: 38 min       Electronically Signed by: Jeanine Vanessa [id number] PTA

## 2020-07-07 ENCOUNTER — HOSPITAL ENCOUNTER (OUTPATIENT)
Dept: PHYSICAL THERAPY | Age: 60
Setting detail: THERAPIES SERIES
Discharge: HOME | End: 2020-07-07
Payer: COMMERCIAL

## 2020-07-07 PROCEDURE — 97110 THERAPEUTIC EXERCISES: CPT

## 2020-07-07 NOTE — PROGRESS NOTES
Notes   Quad sets  5 seconds 15x X With towel roll under heel    seated marching and ankle pumps 1 15x  alternating   Heel slides 2 sets  5 seconds 10x X 1 set wiith and 1 set without strap   SAQ's  1  10x X With assist for carrington ROM for last 2-3 reps   SLR  5 seconds 10x X Mild quad lag   Hip abduction   10x X    Seated knee flexion  1 10x X Seated EOM using Right LE for overpressure   NuStep  5 minutes  X Seat 9/arm 10 Level 4    Step stretches 20 seconds  3x X Knee flexion, hamstring and calf    LAQ 5 seconds 10x X Left LE seated EOM   Knee ROM   X AROM Neutral extension to 91 degrees of flexion;  AAROM with strap 92 degrees flexion   Standing Heel/toe raises, marching, mini-squats  10x each X    3-way hip   10x  X Bilateral LEs, cues for less UE support            Specific Interventions Next Treatment: Progress strengthening and range of motion as able. Activity/Treatment Tolerance:  [x]  Patient tolerated treatment well  []  Patient limited by fatigue  []  Patient limited by pain   []  Patient limited by medical complications  []  Other:     Assessment: Patient demonstrated neutral extension today without gains in flexion. Mild pain complaints. Multiple areas of scabbing around incision area. Prognosis: good    GOALS:  Patient Goal: To have less knee pain, walk without walker and return to driving and work    Short Term Goal Time Frame: 4 weeks  Short Term Goals:  1. Patient to report of decreased pain levels 5/10 to no greater than 2/10 with standing and walking. 2. Patient to demonstrate increased left knee ROM: from 6 degrees to 83 degrees to 0 degrees to 110 degrees knee flexion with increased ease with transfers, walking and negotiating steps. 3. Patient to demonstrate increased strength LLE at hip and knee musculature. Hip flexion 4/5, knee 4-/5.  4. Patient to ambulate from roller walker to straight cane with equal wt shift, knee flexion through swing phase and heel strike to foot flat.   For community distances. 5. Patient to negotiated 4 steps with single handrail reciprocally in order to negotiated stair steps at home. Long Term Goal Time Frame: 8 weeks  1. Patient to demonstrate knee ROM 0-110 degrees to 0 degrees to 120 degrees flexion with increased ease with stair steps reciprocally, curbs and getting in and out of car. 2.Patient to ambulate without assistive device for limited community distances and household distances with normal gait pattern. LE functional index 18/80 to 50/80.   3 Patient independent in HEP with progression in strength, ROM, gait and functional activities. Patient Education:   [x]  HEP/Education Completed: continue with HEP and ice. Continued working on knee flexion and hamstring stretches  []  No new Education completed  []  Reviewed Prior HEP      [x]  Patient verbalized and/or demonstrated understanding of education provided. []  Patient unable to verbalize and/or demonstrate understanding of education provided. Will continue education. PLAN:  Treatment Recommendations: Strengthening, Range of Motion, Balance Training, Functional Mobility Training, Transfer Training, Gait Training, Stair Training, Manual Therapy - Joint Manipulation, Pain Management, Home Exercise Program, Patient Education, Safety Education and Training and Self-Care Education and Training    []  Plan of care initiated. Plan to see patient 3 times per week for 8 weeks to address the treatment planned outlined above.   [x]  Continue with current plan of care  []  Modify plan of care as follows:    []  Hold pending physician visit  []  Discharge    Time In 0831   Time Out 0915       Timed Code Minutes: Minutes Units   ADL (46233)     Aquatics (43058)     Gait (61831)     Manual Therapy (74126)     Massage (04410)     Neuro (07105)     Th. Activities (93299)     Th. Exercise (72453) 45 3   Ultrasound (44768)     Ionto (93639)     Manual E-Stim (65973)          TOTAL  TREATMENT TIME: 39 minutes       Electronically Signed by: Felicitas Vargas

## 2020-07-09 ENCOUNTER — HOSPITAL ENCOUNTER (OUTPATIENT)
Dept: PHYSICAL THERAPY | Age: 60
Setting detail: THERAPIES SERIES
Discharge: HOME | End: 2020-07-09
Payer: COMMERCIAL

## 2020-07-09 PROCEDURE — 97140 MANUAL THERAPY 1/> REGIONS: CPT

## 2020-07-09 PROCEDURE — 97110 THERAPEUTIC EXERCISES: CPT

## 2020-07-09 NOTE — PROGRESS NOTES
completed after. Patient very tight and tender. Exercise/  Intervention Sets/  Sec Reps  Notes   Quad sets  5 seconds 15x X With towel roll under heel    seated marching and ankle pumps 1 15x  alternating   Heel slides 2 sets  5 seconds 10x X 1 set with and 1 set without strap   SAQ's  1  10x  With assist for carrington ROM for last 2-3 reps   SLR  5 seconds 10x X Mild quad lag   Hip abduction   10x     Seated knee flexion  1 10x X Seated EOM using Right LE for overpressure   NuStep  5 minutes  X Seat 9/arm 10 Level 4    Step stretches 20 seconds  3x X Knee flexion, hamstring and calf    LAQ 5 seconds 10x X Left LE seated EOM   Knee ROM   X AROM Neutral extension to 97 degrees of flexion;  AAROM with strap 99 degrees flexion   Standing Heel/toe raises, marching, mini-squats  10x each     3-way hip   10x   Bilateral LEs, cues for less UE support            Specific Interventions Next Treatment: Progress strengthening and range of motion as able. Activity/Treatment Tolerance:  [x]  Patient tolerated treatment well  []  Patient limited by fatigue  []  Patient limited by pain   []  Patient limited by medical complications  []  Other:     Assessment: Patient demonstrated neutral extension and improved knee flexion today. Added manual work today due to complaints of increased pain and tightness at lateral leg. Patient very tender and tight at Left IT band and distal quad. Encouraged patient to use ice at home on the knee and trial heat higher on the thigh/IT band followed by massage at home. Slight increased pain noted at end of session. GOALS:  Patient Goal: To have less knee pain, walk without walker and return to driving and work    Short Term Goal Time Frame: 4 weeks  Short Term Goals:  1. Patient to report of decreased pain levels 5/10 to no greater than 2/10 with standing and walking.   2. Patient to demonstrate increased left knee ROM: from 6 degrees to 83 degrees to 0 degrees to 110 degrees knee flexion with increased ease with transfers, walking and negotiating steps. 3. Patient to demonstrate increased strength LLE at hip and knee musculature. Hip flexion 4/5, knee 4-/5.  4. Patient to ambulate from roller walker to straight cane with equal wt shift, knee flexion through swing phase and heel strike to foot flat. For community distances. 5. Patient to negotiated 4 steps with single handrail reciprocally in order to negotiated stair steps at home. Long Term Goal Time Frame: 8 weeks  1. Patient to demonstrate knee ROM 0-110 degrees to 0 degrees to 120 degrees flexion with increased ease with stair steps reciprocally, curbs and getting in and out of car. 2.Patient to ambulate without assistive device for limited community distances and household distances with normal gait pattern. LE functional index 18/80 to 50/80.   3 Patient independent in HEP with progression in strength, ROM, gait and functional activities. Patient Education:   [x]  HEP/Education Completed: continue with HEP and ice. Continued working on knee flexion and hamstring stretches  []  No new Education completed  []  Reviewed Prior HEP      [x]  Patient verbalized and/or demonstrated understanding of education provided. []  Patient unable to verbalize and/or demonstrate understanding of education provided. Will continue education. PLAN:  Treatment Recommendations: Strengthening, Range of Motion, Balance Training, Functional Mobility Training, Transfer Training, Gait Training, Stair Training, Manual Therapy - Joint Manipulation, Pain Management, Home Exercise Program, Patient Education, Safety Education and Training and Self-Care Education and Training    []  Plan of care initiated. Plan to see patient 3 times per week for 8 weeks to address the treatment planned outlined above.   [x]  Continue with current plan of care  []  Modify plan of care as follows:    []  Hold pending physician visit  []  Discharge    Time In 0830 Time Out 0915       Timed Code Minutes: Minutes Units   ADL (85493)     Aquatics (97258)     Gait (08567)     Manual Therapy (32816)     Massage (93846)     Neuro (85907)     Th. Activities (86428)     Th. Exercise (09778) 45 3   Ultrasound (34553)     Ionto (18377)     Manual E-Stim (14332)          TOTAL  TREATMENT TIME: 45 minutes       Electronically Signed by: Yu German

## 2020-07-13 ENCOUNTER — HOSPITAL ENCOUNTER (OUTPATIENT)
Dept: PHYSICAL THERAPY | Age: 60
Setting detail: THERAPIES SERIES
Discharge: HOME | End: 2020-07-13
Payer: COMMERCIAL

## 2020-07-13 PROCEDURE — 97110 THERAPEUTIC EXERCISES: CPT

## 2020-07-13 PROCEDURE — 97140 MANUAL THERAPY 1/> REGIONS: CPT

## 2020-07-13 NOTE — PROGRESS NOTES
[facility]  PHYSICAL THERAPY  [] EVALUATION  [x] DAILY NOTE [] PROGRESS NOTE [] DISCHARGE NOTE    [x] OUTPATIENT REHABILITATION CENTER [facility]       Date: 2020  Patient Name:  Sen Jauregui  : 1960  MRN: [id number]    Referring Practitioner Reyes Carreon DO   Diagnosis Unilateral primary osteoarthritis, left knee [M17.12]  Pain in left knee [M25.562]  Sprain of unspecified site of unspecified knee, initial encounter [S83.90XA]    Treatment Diagnosis S/p left TKR, difficulty walking, knee pain   Date of Evaluation 2020   Additional Pertinent History Left TKR 2020, 2 back surgeries lower lumbar with fusion, right great toe surgery 2020,       Functional Outcome Measure Used LE Functional Index   Functional Outcome Score       Insurance: Payor: R /  /  / ,     Authorization Information: 100 visits per calendar year combined for PT, OT, ST. No precertification. Aquatic yes, modalities yes   Visit # 6, 6/10 for progress note   Visits Allowed:    Recertification Date:    Physician Follow-Up: 2020     PT evaluate and treat, ex for ROM, strength, aquatic, gait analysis, home instructions, estim, cryotherapy, modalities as indicated. 3x per week for 6 weeks. SUBJECTIVE: Patient states he knee is still wanting to give out about once a day. Reports having a lot of nerve pain and the doctor started him on a steroid pack on Saturday. Pain is mostly at night. Sore and tight on both sides of the Left knee. TREATMENT   Precautions: TKR TK protocol     Pain:  4-5/10  Left knee   TREATMENT                                  X in shaded column indicates Activity Completed Today   Modalities Parameters/  Location  Notes   Ice pack  Left knee anterior/posterior                 Manual Therapy Time/  Technique  Notes   Myofascial work to Left IT band and distal quad 8 minutes X Stretches completed after. Patient very tight and tender.  ([facility]  today)               Exercise/  Intervention Sets/  Sec Reps  Notes   Quad sets  5 seconds 15x X With towel roll under heel   Seated marching and ankle pumps 1 15x  alternating   Heel slides 5 seconds 15x X With strap   SAQ's  5 seconds 10x X With assist for carrington ROM for last 2-3 reps   SLR  5 seconds 10x X Mild quad lag   Hip abduction   10x     Seated knee flexion  1 10x X Seated EOM using Right LE for overpressure   NuStep  5 minutes  X Seat 9/arm 10 Level 5    Step stretches 20 seconds  3x X Knee flexion, hamstring and calf    LAQ 5 seconds 10x X Left LE seated EOM   Knee ROM   X AROM Neutral extension to 97 degrees of flexion;  AAROM with strap 104 degrees flexion   Standing Heel/toe raises, marching, mini-squats  10x each X    3-way hip   10x  X Bilateral LEs, cues for less UE support   Patellar mobs         Specific Interventions Next Treatment: Progress strengthening and range of motion as able. Add patellar mobs next session. Activity/Treatment Tolerance:  [x]  Patient tolerated treatment well  []  Patient limited by fatigue  []  Patient limited by pain   []  Patient limited by medical complications  []  Other:     Assessment: Patient still tight and tender at medial and lateral Left knee. Educated patient on self massage to medial and lateral knee and IT band at home. Pain remained 4-5/10 at end of session. GOALS:  Patient Goal: To have less knee pain, walk without walker and return to driving and work    Short Term Goal Time Frame: 4 weeks  Short Term Goals:  1. Patient to report of decreased pain levels 5/10 to no greater than 2/10 with standing and walking. 2. Patient to demonstrate increased left knee ROM: from 6 degrees to 83 degrees to 0 degrees to 110 degrees knee flexion with increased ease with transfers, walking and negotiating steps. 3. Patient to demonstrate increased strength LLE at hip and knee musculature.  Hip flexion 4/5, knee 4-/5.  4. Patient to ambulate from roller walker to straight cane with equal wt shift, knee flexion through swing phase and heel strike to foot flat. For community distances. 5. Patient to negotiated 4 steps with single handrail reciprocally in order to negotiated stair steps at home. Long Term Goal Time Frame: 8 weeks  1. Patient to demonstrate knee ROM 0-110 degrees to 0 degrees to 120 degrees flexion with increased ease with stair steps reciprocally, curbs and getting in and out of car. 2.Patient to ambulate without assistive device for limited community distances and household distances with normal gait pattern. LE functional index 18/80 to 50/80.   3 Patient independent in HEP with progression in strength, ROM, gait and functional activities. Patient Education:   [x]  HEP/Education Completed: continue with HEP and ice. Continued working on knee flexion and hamstring stretches. Work on self massage at home to both sides of knee and scar massage with vitamin E oil. []  No new Education completed  []  Reviewed Prior HEP      [x]  Patient verbalized and/or demonstrated understanding of education provided. []  Patient unable to verbalize and/or demonstrate understanding of education provided. Will continue education. PLAN:  Treatment Recommendations: Strengthening, Range of Motion, Balance Training, Functional Mobility Training, Transfer Training, Gait Training, Stair Training, Manual Therapy - Joint Manipulation, Pain Management, Home Exercise Program, Patient Education, Safety Education and Training and Self-Care Education and Training    []  Plan of care initiated. Plan to see patient 3 times per week for 8 weeks to address the treatment planned outlined above.   [x]  Continue with current plan of care  []  Modify plan of care as follows:    []  Hold pending physician visit  []  Discharge    Time In 0845   Time Out 0915       Timed Code Minutes: Minutes Units   ADL (88 [id number] 60)     Aquatics (38225)     Gait (13558)     Manual Therapy (57623) 8 1   Massage (19403)     Neuro (61823)     Th. Activities (88852)     Th. Exercise (38350) 37 2   Ultrasound (72836)     Ionto (43696)     Manual E-Stim (54355)          TOTAL  TREATMENT TIME: 45 minutes       Electronically Signed by: Ayanna Baugh

## 2020-07-15 ENCOUNTER — HOSPITAL ENCOUNTER (OUTPATIENT)
Dept: PHYSICAL THERAPY | Age: 60
Setting detail: THERAPIES SERIES
Discharge: HOME | End: 2020-07-15
Payer: COMMERCIAL

## 2020-07-15 PROCEDURE — 97140 MANUAL THERAPY 1/> REGIONS: CPT

## 2020-07-15 PROCEDURE — 97110 THERAPEUTIC EXERCISES: CPT

## 2020-07-15 NOTE — PROGRESS NOTES
[address] [facility]  PHYSICAL THERAPY  [] EVALUATION  [x] DAILY NOTE [] PROGRESS NOTE [] DISCHARGE NOTE    [x] [facility] - [place]       Date: 7/15/2020  Patient Name:  Chastity Mittal  : 1960  MRN: [id number]       Referring Practitioner Marlis Schlatter, DO   Diagnosis Unilateral primary osteoarthritis, left knee [M17.12]  Pain in left knee [M25.562]  Sprain of unspecified site of unspecified knee, initial encounter [S83.90XA]    Treatment Diagnosis S/p left TKR, difficulty walking, knee pain   Date of Evaluation 2020   Additional Pertinent History Left TKR 2020, 2 back surgeries lower lumbar with fusion, right great toe surgery 2020,       Functional Outcome Measure Used LE Functional Index   Functional Outcome Score       Insurance: Payor: [facility] /  /  / ,     Authorization Information: 100 visits per calendar year combined for PT, OT, ST. No precertification. Aquatic yes, modalities yes   Visit # 7, 7/10 for progress note   Visits Allowed:    Recertification Date: 663   Physician Follow-Up: 2020     PT evaluate and treat, ex for ROM, strength, aquatic, gait analysis, home instructions, estim, cryotherapy, modalities as indicated. 3x per week for 6 weeks. SUBJECTIVE: Patient states pain is still worse at night. Reports he is still having a lot of trouble sleeping at night and only averaging about 2 hours of sleep a night. States he is still on the steroid pack for the nerve pain but that should be finished today or tomorrow. Steroid pack has not made a difference. Patient taking Tramadol for pain before bed, but that also doesn't help with sleep.        TREATMENT   Precautions: TKR TK protocol     Pain:  4-5/10  Left knee   TREATMENT                                  X in shaded column indicates Activity Completed Today   Modalities Parameters/  Location  Notes   Ice pack  Left knee anterior/posterior                 Manual Therapy Time/  Technique  Notes   Myofascial work to Left IT band and distal quad 10 minutes X Stretches completed after. Patient very tight and tender. ([facility]  today). Completed prior to stretches. Patient may benefit from Astym treatment to Left leg. Patellar mobs 1-2 minutes X Educated on technique for HEP         Exercise/  Intervention Sets/  Sec Reps  Notes   Quad sets  5 seconds 15x X With towel roll under heel   Seated marching and ankle pumps 1 15x  alternating   Heel slides 5 seconds 10x X With strap   SAQ's  5 seconds 10x  With assist for carrington ROM for last 2-3 reps   SLR  5 seconds 10x X Mild quad lag   Hip abduction   10x     Seated knee flexion  1 10x  Seated EOM using Right LE for overpressure   NuStep  6 minutes  X Seat 9/arm 10 Level 5    Step stretches 20 seconds  3x X Knee flexion, hamstring and calf    LAQ 5 seconds 10x X Left LE seated EOM   Knee ROM   X AROM Neutral extension to 101 degrees of flexion;  AAROM with strap 104 degrees flexion   Standing Heel/toe raises, marching, mini-squats  10x each X    3-way hip   10x   Bilateral LEs, cues for less UE support            Specific Interventions Next Treatment: Progress strengthening and range of motion as able. Activity/Treatment Tolerance:  [x]  Patient tolerated treatment well  []  Patient limited by fatigue  []  Patient limited by pain   []  Patient limited by medical complications  []  Other:     Assessment: Patient still tight and tender at medial and lateral Left knee. Educated patient on self massage to medial and lateral knee and IT band at home. Pain remained 4-5/10 at end of session. GOALS:  Patient Goal: To have less knee pain, walk without walker and return to driving and work    Short Term Goal Time Frame: 4 weeks  Short Term Goals:  1. Patient to report of decreased pain levels 5/10 to no greater than 2/10 with standing and walking.   2. Patient to demonstrate increased left knee ROM: from 6 degrees to 83 degrees to 0 degrees to 110 degrees knee flexion with increased ease with transfers, walking and negotiating steps. 3. Patient to demonstrate increased strength LLE at hip and knee musculature. Hip flexion 4/5, knee 4-/5.  4. Patient to ambulate from roller walker to straight cane with equal wt shift, knee flexion through swing phase and heel strike to foot flat. For community distances. 5. Patient to negotiated 4 steps with single handrail reciprocally in order to negotiated stair steps at home. Long Term Goal Time Frame: 8 weeks  1. Patient to demonstrate knee ROM 0-110 degrees to 0 degrees to 120 degrees flexion with increased ease with stair steps reciprocally, curbs and getting in and out of car. 2.Patient to ambulate without assistive device for limited community distances and household distances with normal gait pattern. LE functional index 18/80 to 50/80.   3 Patient independent in HEP with progression in strength, ROM, gait and functional activities. Patient Education:   [x]  HEP/Education Completed: continue with HEP and ice. Continued working on knee flexion and hamstring stretches. Work on self massage at home to both sides of knee and scar massage with vitamin E oil. []  No new Education completed  []  Reviewed Prior HEP      [x]  Patient verbalized and/or demonstrated understanding of education provided. []  Patient unable to verbalize and/or demonstrate understanding of education provided. Will continue education. PLAN:  Treatment Recommendations: Strengthening, Range of Motion, Balance Training, Functional Mobility Training, Transfer Training, Gait Training, Stair Training, Manual Therapy - Joint Manipulation, Pain Management, Home Exercise Program, Patient Education, Safety Education and Training and Self-Care Education and Training    []  Plan of care initiated. Plan to see patient 3 times per week for 8 weeks to address the treatment planned outlined above.   [x]  Continue with current plan of care  []  Modify plan of care as follows:    []  Hold pending physician visit  []  Discharge    Time In 0845   Time Out 0915       Timed Code Minutes: Minutes Units   ADL (88 [phone] [id number] 60)     Aquatics (07121)     Gait (42969)     Manual Therapy ([phone]) 10 1   Massage (75965)     Neuro (11655)     Th. Activities (09491)     Th. Exercise (41169) 35 2   Ultrasound (88766)     Ionto (23664)     Manual E-Stim (06671)          TOTAL  TREATMENT TIME: 45 minutes       Electronically Signed by: Chace Baugh

## 2020-07-17 ENCOUNTER — HOSPITAL ENCOUNTER (OUTPATIENT)
Dept: PHYSICAL THERAPY | Age: 60
Setting detail: THERAPIES SERIES
Discharge: HOME | End: 2020-07-17
Payer: COMMERCIAL

## 2020-07-17 PROCEDURE — 97110 THERAPEUTIC EXERCISES: CPT

## 2020-07-17 NOTE — PROGRESS NOTES
[address] [facility]  PHYSICAL THERAPY  [] EVALUATION  [x] DAILY NOTE [] PROGRESS NOTE [] DISCHARGE NOTE    [x] [facility] - [place]       Date: 2020  Patient Name:  Tena Del Real  : 1960  MRN: [id number]       Referring Practitioner Deloris Valencia DO   Diagnosis Unilateral primary osteoarthritis, left knee [M17.12]  Pain in left knee [M25.562]  Sprain of unspecified site of unspecified knee, initial encounter [S83.90XA]    Treatment Diagnosis S/p left TKR, difficulty walking, knee pain   Date of Evaluation 2020   Additional Pertinent History Left TKR 2020, 2 back surgeries lower lumbar with fusion, right great toe surgery 2020,       Functional Outcome Measure Used LE Functional Index   Functional Outcome Score       Insurance: Payor: R /  /  / ,     Authorization Information: 100 visits per calendar year combined for PT, OT, ST. No precertification. Aquatic yes, modalities yes   Visit # 8, 8/10 for progress note   Visits Allowed:    Recertification Date:    Physician Follow-Up: 2020     PT evaluate and treat, ex for ROM, strength, aquatic, gait analysis, home instructions, estim, cryotherapy, modalities as indicated. 3x per week for 6 weeks. SUBJECTIVE: Pt reproting nerve pain is still bothering him and night and currently reporting pain level 5/10. TREATMENT   Precautions: TKR TK protocol     Pain:  5/10  Left knee   TREATMENT                                  X in shaded column indicates Activity Completed Today   Modalities Parameters/  Location  Notes   Ice pack  Left knee anterior/posterior                 Manual Therapy Time/  Technique  Notes   Myofascial work to Left IT band and distal quad 10 minutes  Stretches completed after. Patient very tight and tender. ([facility]  today). Completed prior to stretches. Patient may benefit from Astym treatment to Left leg.     Patellar mobs 1-2 minutes x steps.   3. Patient to demonstrate increased strength LLE at hip and knee musculature. Hip flexion 4/5, knee 4-/5.  4. Patient to ambulate from roller walker to straight cane with equal wt shift, knee flexion through swing phase and heel strike to foot flat. For community distances. 5. Patient to negotiated 4 steps with single handrail reciprocally in order to negotiated stair steps at home. Long Term Goal Time Frame: 8 weeks  1. Patient to demonstrate knee ROM 0-110 degrees to 0 degrees to 120 degrees flexion with increased ease with stair steps reciprocally, curbs and getting in and out of car. 2.Patient to ambulate without assistive device for limited community distances and household distances with normal gait pattern. LE functional index 18/80 to 50/80.   3 Patient independent in HEP with progression in strength, ROM, gait and functional activities. Patient Education:   []  HEP/Education Completed:  [x]  No new Education completed  []  Reviewed Prior HEP    []  Patient verbalized and/or demonstrated understanding of education provided. []  Patient unable to verbalize and/or demonstrate understanding of education provided. Will continue education. PLAN:  Treatment Recommendations: Strengthening, Range of Motion, Balance Training, Functional Mobility Training, Transfer Training, Gait Training, Stair Training, Manual Therapy - Joint Manipulation, Pain Management, Home Exercise Program, Patient Education, Safety Education and Training and Self-Care Education and Training    []  Plan of care initiated. Plan to see patient 3 times per week for 8 weeks to address the treatment planned outlined above.   [x]  Continue with current plan of care  []  Modify plan of care as follows:    []  Hold pending physician visit  []  Discharge    Time In 0845   Time Out 0936       Timed Code Minutes: Minutes Units   ADL (88 [phone] [id number] 60)     Aquatics (17156)     Gait (79521)     Manual Therapy ([id number])     Massage (37574) Neuro (60483)     Th. Activities (26887)     Th. Exercise (88540) 51 3   Ultrasound (80471)     Ionto (91165)     Manual E-Stim (59218)          TOTAL  TREATMENT TIME: 51 minutes       Electronically Signed by: Arabella Mckinley

## 2020-07-20 ENCOUNTER — APPOINTMENT (OUTPATIENT)
Dept: PHYSICAL THERAPY | Age: 60
End: 2020-07-20
Payer: COMMERCIAL

## 2020-07-21 ENCOUNTER — APPOINTMENT (OUTPATIENT)
Dept: PHYSICAL THERAPY | Age: 60
End: 2020-07-21
Payer: COMMERCIAL

## 2020-07-23 ENCOUNTER — APPOINTMENT (OUTPATIENT)
Dept: PHYSICAL THERAPY | Age: 60
End: 2020-07-23
Payer: COMMERCIAL

## 2020-07-30 ENCOUNTER — HOSPITAL ENCOUNTER (OUTPATIENT)
Dept: PHYSICAL THERAPY | Age: 60
Setting detail: THERAPIES SERIES
Discharge: HOME | End: 2020-07-30
Payer: COMMERCIAL

## 2020-07-30 PROCEDURE — 97110 THERAPEUTIC EXERCISES: CPT

## 2020-07-30 ASSESSMENT — PAIN DESCRIPTION - ORIENTATION: ORIENTATION: LEFT

## 2020-07-30 ASSESSMENT — PAIN DESCRIPTION - PAIN TYPE: TYPE: SURGICAL PAIN

## 2020-07-30 ASSESSMENT — PAIN DESCRIPTION - LOCATION: LOCATION: KNEE

## 2020-07-30 ASSESSMENT — PAIN SCALES - GENERAL: PAINLEVEL_OUTOF10: 5

## 2020-07-30 NOTE — PROGRESS NOTES
[address] [facility]  PHYSICAL THERAPY  [] EVALUATION  [] DAILY NOTE [x] PROGRESS NOTE [] DISCHARGE NOTE    [x] OUTPATIENT RE[facility]AB[facility]ATION [facility] - [place]       Date: 2020  Patient Name:  You Woodard  : 1960  MRN: [id number]       Referring Practitioner Colon Meals,    Diagnosis Unilateral primary osteoarthritis, left knee [M17.12]  Pain in left knee [M25.562]  Sprain of unspecified site of unspecified knee, initial encounter [S83.90XA]    Treatment Diagnosis S/p left TKR, difficulty walking, knee pain   Date of Evaluation 2020   Additional Pertinent History Left TKR 2020, 2 back surgeries lower lumbar with fusion, right great toe surgery 2020,       Functional Outcome Measure Used LE Functional Index   Functional Outcome Score       Insurance: Payor: R /  /  / ,     Authorization Information: 100 visits per calendar year combined for PT, OT, ST. No precertification. Aquatic yes, modalities yes   Visit # 9,   for progress note  for next PN    Visits Allowed:    Recertification Date:    Physician Follow-Up: 2020     PT evaluate and treat, ex for ROM, strength, aquatic, gait analysis, home instructions, estim, cryotherapy, modalities as indicated. 3x per week for 6 weeks. SUBJECTIVE: Patient has missed 2 PT sessions due to being tested for COVID due to wife having cold type symptoms. Testing was negative. Patient continues to have nerve type pain with difficulty sleeping with pain from knee to front of leg. Discouraged about swelling, stiffness and pain at knee. Did contact physician regarding medication for nerve/pain at left knee. Is walking better without cane or walker. Occasionally used cane for community distances.      TREATMENT   Precautions: TKR TK protocol     Pain:  4/10  Left knee   TREATMENT                                  X in shaded column indicates Activity Completed Today   Modalities Parameters/  Location  Notes Ice pack  Left knee anterior/posterior                 Manual Therapy Time/  Technique  Notes   Myofascial work to Left IT band and distal quad 10 minutes  Stretches completed after. Patient very tight and tender. (No [place]  today). Completed prior to stretches. Patient may benefit from Astym treatment to Left leg. Patellar mobs 1-2 minutes     Exercise/  Intervention Sets/  Sec Reps  Notes   Quad sets  5 sec 15x x With towel roll under heel   Prone knee flexion stretch with strap 5 sec X 10     Heel slides 5 sec 15x x With strap   SAQ's  5 sec 15x  Lacking full knee extension 20 degrees   SLR  5 sec 15x  Mild quad lag   Hip abduction   15x     Seated knee flexion  5sec 10x x Seated EOM    NuStep  5 min  x Seat 9/arm 10 Level 3    Step stretches 20 sec  3x X Knee flexion, hamstring and calf    Gentle distraction 30 sec 3x  At edge of mat table   Seated marching, hip adduction, heel/toe raises    Ball for adduction   LAQ 5 sec 15x X Left LE seated EOM   Knee ROM   X AROM 7 to 115 degrees of flexion   Standing Heel/toe raises, marching, mini-squats  15x each x    3-way hip   15x  X Bilateral LEs, cues for less UE support   Seated hamstring stretch . Knee felex 15 sec x3 x    Rocker Board 2 directions Balance 30 sec Taps x 15 x UE support needed     Specific Interventions Next Treatment: Progress strengthening and range of motion as able. Activity/Treatment Tolerance:  [x]  Patient tolerated treatment well  []  Patient limited by fatigue  []  Patient limited by pain   []  Patient limited by medical complications  []  Other:     Assessment: Reassessment. Refer to goal summary for status. Knee ROM 7 degrees to 115 degrees flexion LLE. Strength hip 4-/5, 20 degree quad lag with Left knee extension. Note ambulation without assistive device with decreased wt shift and heelstrike LLE. Will continue PT to address listed deficits for 2-3x per week.      GOALS:  Patient Goal: To have less knee pain, walk without walker and return to driving and work GOAL PARTIALLY MET Patient driving but not returned to work. Short Term Goal Time Frame: 4 weeks  Short Term Goals:  1. Patient to report of decreased pain levels 5/10 to no greater than 2/10 with standing and walking. GOAL NOT MET Patient having knee pain 4/10 and pain along joint line and pain into front of left leg. Difficulty sleeping due to pain. 2. Patient to demonstrate increased left knee ROM: from 6 degrees to 83 degrees to 0 degrees to 110 degrees knee flexion with increased ease with transfers, walking and negotiating steps. GOAL PARTIALLY MET  7 degrees to 115 degrees flexion. Active ROM 20 degrees, to 105 degrees. 3. Patient to demonstrate increased strength LLE at hip and knee musculature. Hip flexion 4/5, knee 4-/5. GOAL NOT MET Knee note 20 degree quad lag, MMT not completed on knee due to knee replacement surgery. Hip flexors 4-/5 abduction 4-/5.    4. Patient to ambulate from roller walker to straight cane with equal wt shift, knee flexion through swing phase and heel strike to foot flat. For community distances. GOAL MET Patient ambulating without assistive device. Antalgic gait with decreased wt shift through LLE. 5. Patient to negotiated 4 steps with single handrail reciprocally in order to negotiated stair steps at home. GOAL NOT MET Patient negoatiating stair step non reciprocally with bilateral hand rails. Long Term Goal Time Frame: 8 weeks  1. Patient to demonstrate knee ROM 0-110 degrees to 0 degrees to 120 degrees flexion with increased ease with stair steps reciprocally, curbs and getting in and out of car. GOAL NOT MET see STG ROM 7 degrees to 115 degrees left knee flexion. 2.Patient to ambulate without assistive device for limited community distances and household distances with normal gait pattern. LE functional index 18/80 to 50/80.  GOAL NOT MET LE Functional Index 34/80  3 Patient independent in HEP with progression in strength, ROM, gait and functional activities. ONGOING      Patient Education:   []  HEP/Education Completed:  [x]  No new Education completed  []  Reviewed Prior HEP    []  Patient verbalized and/or demonstrated understanding of education provided. []  Patient unable to verbalize and/or demonstrate understanding of education provided. Will continue education. PLAN:  Treatment Recommendations: Strengthening, Range of Motion, Balance Training, Functional Mobility Training, Transfer Training, Gait Training, Stair Training, Manual Therapy - Joint Manipulation, Pain Management, Home Exercise Program, Patient Education, Safety Education and Training and Self-Care Education and Training    []  Plan of care initiated. Plan to see patient 3 times per week for 8 weeks to address the treatment planned outlined above.   [x]  Continue with current plan of care  []  Modify plan of care as follows:    []  Hold pending physician visit  []  Discharge    Time In 1000   Time Out 1043       Timed Code Minutes: Minutes Units   ADL (88 [id number] 60)     Aquatics (20147)     Gait (86024)     Manual Therapy (91548)     Massage (30171)     Neuro (32879)     Th. Activities (33393)     Th. Exercise (19068) 43 3   Ultrasound (98448)     Ionto (16837)     Manual E-Stim (97830)          TOTAL  TREATMENT TIME: 43 minutes       Electronically Signed by: Jennifer Hector

## 2020-07-31 ENCOUNTER — HOSPITAL ENCOUNTER (OUTPATIENT)
Dept: PHYSICAL THERAPY | Age: 60
Setting detail: THERAPIES SERIES
Discharge: HOME | End: 2020-07-31
Payer: COMMERCIAL

## 2020-07-31 PROCEDURE — 97110 THERAPEUTIC EXERCISES: CPT

## 2020-07-31 NOTE — PROGRESS NOTES
[address] [facility]  PHYSICAL THERAPY  [] EVALUATION  [] DAILY NOTE [x] PROGRESS NOTE [] DISCHARGE NOTE    [x] OUTPATIENT REHABILITATION CENTER [facility]       Date: 2020  Patient Name:  Steven Robledo  : 1960  MRN: [id number]       Referring Practitioner Bernardo Habermann, DO   Diagnosis Unilateral primary osteoarthritis, left knee [M17.12]  Pain in left knee [M25.562]  Sprain of unspecified site of unspecified knee, initial encounter [S83.90XA]    Treatment Diagnosis S/p left TKR, difficulty walking, knee pain   Date of Evaluation 2020   Additional Pertinent History Left TKR 2020, 2 back surgeries lower lumbar with fusion, right great toe surgery 2020,       Functional Outcome Measure Used LE Functional Index   Functional Outcome Score       Insurance: Payor: [facility] /  /  / ,     Authorization Information: 100 visits per calendar year combined for PT, OT, ST. No precertification. Aquatic yes, modalities yes   Visit # 10,  for next PN    Visits Allowed:    Recertification Date:    Physician Follow-Up: 2020     PT evaluate and treat, ex for ROM, strength, aquatic, gait analysis, home instructions, estim, cryotherapy, modalities as indicated. 3x per week for 6 weeks. SUBJECTIVE: Pt stated he called  About L knee pain.  suggested to use tylenol and ibuprofin. Pt stated it didn't help last night and burning pain continues to keep him awake. Pt returns to Dr on Aug 17. TREATMENT   Precautions: TKR TK protocol     Pain:  5/10  Left knee   TREATMENT                                  X in shaded column indicates Activity Completed Today   Modalities Parameters/  Location  Notes   Ice pack  Left knee anterior/posterior                 Manual Therapy Time/  Technique  Notes   Myofascial work to Left IT band and distal quad 10 minutes  Stretches completed after. Patient very tight and tender. (No Hawk  today). Completed prior to stretches. Patient may benefit from Astym treatment to Left leg. Patellar mobs X10 x    Exercise/  Intervention Sets/  Sec Reps  Notes   Quad sets  5 sec 15x  With towel roll under heel   Prone knee flexion stretch with strap 5 sec X 10 x    Heel slides 5 sec 15x x With strap   SAQ's  5 sec 15x  Lacking full knee extension 20 degrees   SLR  3directions 5 sec 15x x Mild quad lag with SLR          Seated knee flexion  5sec 10x x Seated EOM    NuStep  5 min  x Seat 9/arm 10 Level 3    Step stretches 20 sec  3x x Knee flexion, hamstring and calf    Gentle distraction 30 sec 3x x On mat table   Seated marching, hip adduction, heel/toe raises    Ball for adduction   LAQ 5 sec 15x X Left LE seated EOM   Knee ROM    AROM 7 to 115 degrees of flexion   Standing Heel/toe raises, marching, mini-squats  15x each x    3-way hip   15x  X Bilateral LEs, cues for less UE support   Seated hamstring stretch . Knee felex 15 sec x3     Rocker Board 2 directions Balance 30 sec Taps x 15 x UE support needed   2in step up 1 10 x Forward /lateral unable to do on 4in CGA at [employer], lvl1 5min x Half rotations, could not perform full revolution     Specific Interventions Next Treatment: Progress strengthening and range of motion as able. Activity/Treatment Tolerance:  [x]  Patient tolerated treatment well  []  Patient limited by fatigue  [x]  Patient limited by pain   []  Patient limited by medical complications  []  Other:     Assessment:Pt continues to have high pain levels especially at night time. L knee continues to feel unstable and patient is unable to put weight through it for step up on 4in. Step. Adjusted step height to 2in step but pt requires B UE support for safety. Pt has small red dots on medial side of knee where he had previous blistering after surgery. Pt educated to call Dr if any fever occurs. L knee is warm to touch.        GOALS:  Patient Goal: To have less knee pain, walk without walker and return to driving and work GOAL PARTIALLY MET Patient driving but not returned to work. Short Term Goal Time Frame: 4 weeks  Short Term Goals:  1. Patient to report of decreased pain levels 5/10 to no greater than 2/10 with standing and walking. GOAL NOT MET Patient having knee pain 4/10 and pain along joint line and pain into front of left leg. Difficulty sleeping due to pain. 2. Patient to demonstrate increased left knee ROM: from 6 degrees to 83 degrees to 0 degrees to 110 degrees knee flexion with increased ease with transfers, walking and negotiating steps. GOAL PARTIALLY MET  7 degrees to 115 degrees flexion. Active ROM 20 degrees, to 105 degrees. 3. Patient to demonstrate increased strength LLE at hip and knee musculature. Hip flexion 4/5, knee 4-/5. GOAL NOT MET Knee note 20 degree quad lag, MMT not completed on knee due to knee replacement surgery. Hip flexors 4-/5 abduction 4-/5.    4. Patient to ambulate from roller walker to straight cane with equal wt shift, knee flexion through swing phase and heel strike to foot flat. For community distances. GOAL MET Patient ambulating without assistive device. Antalgic gait with decreased wt shift through LLE. 5. Patient to negotiated 4 steps with single handrail reciprocally in order to negotiated stair steps at home. GOAL NOT MET Patient negoatiating stair step non reciprocally with bilateral hand rails. Long Term Goal Time Frame: 8 weeks  1. Patient to demonstrate knee ROM 0-110 degrees to 0 degrees to 120 degrees flexion with increased ease with stair steps reciprocally, curbs and getting in and out of car. GOAL NOT MET see STG ROM 7 degrees to 115 degrees left knee flexion. 2.Patient to ambulate without assistive device for limited community distances and household distances with normal gait pattern. LE functional index 18/80 to 50/80.  GOAL NOT MET LE Functional Index 34/80  3 Patient independent in HEP with progression in strength, ROM, gait and functional activities. ONGOING      Patient Education:   [x]  HEP/Education Completed: desensitization on medial and lateral L knee, SLR 3directions  []  No new Education completed  []  Reviewed Prior HEP    []  Patient verbalized and/or demonstrated understanding of education provided. []  Patient unable to verbalize and/or demonstrate understanding of education provided. Will continue education. PLAN:  Treatment Recommendations: Strengthening, Range of Motion, Balance Training, Functional Mobility Training, Transfer Training, Gait Training, Stair Training, Manual Therapy - Joint Manipulation, Pain Management, Home Exercise Program, Patient Education, Safety Education and Training and Self-Care Education and Training    []  Plan of care initiated. Plan to see patient 3 times per week for 8 weeks to address the treatment planned outlined above.   [x]  Continue with current plan of care  []  Modify plan of care as follows:    []  Hold pending physician visit  []  Discharge    Time In 1130   Time Out 1217       Timed Code Minutes: Minutes Units   ADL (88 [id number])     Aquatics ([id number])     Gait (06381)     Manual Therapy (60241)     Massage (43358)     Neuro (65958)     Th. Activities (83680)     Th. Exercise (55510) 47 3   Ultrasound (06135)     Ionto ([id number])     Manual E-Stim ([id number])          TOTAL  TREATMENT TIME: 47 minutes       Electronically Signed by: Ochoa Leblanc

## 2020-08-04 ENCOUNTER — HOSPITAL ENCOUNTER (OUTPATIENT)
Dept: PHYSICAL THERAPY | Age: 60
Setting detail: THERAPIES SERIES
Discharge: HOME | End: 2020-08-04
Payer: COMMERCIAL

## 2020-08-04 PROCEDURE — 97110 THERAPEUTIC EXERCISES: CPT

## 2020-08-04 NOTE — PROGRESS NOTES
prior to stretches. Patient may benefit from Astym treatment to Left leg. Patellar mobs X10 x    Exercise/  Intervention Sets/  Sec Reps  Notes   Quad sets  5 sec 15x x With towel roll under heel   Prone knee flexion stretch with strap 5 sec X 10 x    Heel slides 5 sec 15x x With strap   SAQ's  5 sec 15x  Lacking full knee extension 20 degrees   SLR  3directions 5 sec 15x x Mild quad lag with SLR          Seated knee flexion  5sec 10x  Seated EOM    NuStep  5 min   Seat 9/arm 10 Level 3    Step stretches 20 sec  3x x Knee flexion, hamstring and calf    Gentle distraction 30 sec 3x  On mat table   Seated marching, hip adduction, heel/toe raises    Ball for adduction   LAQ 5 sec 15x X Left LE seated EOM   Knee ROM   x AROM 3 to 105 degrees of flexion   Standing Heel/toe raises, marching, mini-squats  15x each x    3-way hip   15x  X Bilateral LEs, cues for less UE support   Seated hamstring stretch . Knee felex 15 sec x3     Rocker Board 2 directions Balance 30 sec Taps x 15 x UE support needed   2in step up 1 10 x Forward /lateral unable to do on 4in CGA at [facility], lvl1 5min x Half rotations initially. Then full revolutions     Specific Interventions Next Treatment: Progress strengthening and range of motion as able. Activity/Treatment Tolerance:  [x]  Patient tolerated treatment well  []  Patient limited by fatigue  [x]  Patient limited by pain   []  Patient limited by medical complications  []  Other:     Assessment: Pt continues to have nerve pain at medial L knee. Pt taking new medication so will update on symptoms. Pt was able to complete full revolutions on bike this date. GOALS:  Patient Goal: To have less knee pain, walk without walker and return to driving and work GOAL PARTIALLY MET Patient driving but not returned to work. Short Term Goal Time Frame: 4 weeks  Short Term Goals:  1.  Patient to report of decreased pain levels 5/10 to no greater than 2/10 with verbalized and/or demonstrated understanding of education provided. []  Patient unable to verbalize and/or demonstrate understanding of education provided. Will continue education. PLAN:  Treatment Recommendations: Strengthening, Range of Motion, Balance Training, Functional Mobility Training, Transfer Training, Gait Training, Stair Training, Manual Therapy - Joint Manipulation, Pain Management, Home Exercise Program, Patient Education, Safety Education and Training and Self-Care Education and Training    []  Plan of care initiated. Plan to see patient 3 times per week for 8 weeks to address the treatment planned outlined above.   [x]  Continue with current plan of care  []  Modify plan of care as follows:    []  Hold pending physician visit  []  Discharge    Time In 0800   Time Out 0845       Timed Code Minutes: Minutes Units   ADL (88 [id number] 60)     Aquatics (97097)     Gait (03286)     Manual Therapy (06348)     Massage (02273)     Neuro (18797)     Th. Activities (15885)     Th. Exercise (74831) 45 3   Ultrasound (42289)     Ionto (86161)     Manual E-Stim ([id number])          TOTAL  TREATMENT TIME: 45minutes       Electronically Signed by: [employer]

## 2020-08-06 ENCOUNTER — HOSPITAL ENCOUNTER (OUTPATIENT)
Dept: PHYSICAL THERAPY | Age: 60
Setting detail: THERAPIES SERIES
Discharge: HOME | End: 2020-08-06
Payer: COMMERCIAL

## 2020-08-06 PROCEDURE — 97110 THERAPEUTIC EXERCISES: CPT

## 2020-08-06 NOTE — PROGRESS NOTES
and tender. (No Hawk  today). Completed after  to stretches. Patient may benefit from Astym treatment to Left leg. Patellar mobs X10 x    Exercise/  Intervention Sets/  Sec Reps  Notes   Quad sets  5 sec 10x x With towel roll under heel   Prone knee flexion stretch with strap 5 sec X 10     Heel slides 5 sec 15x x    SAQ's  5 sec 15x  Lacking full knee extension 20 degrees   SLR  3directions 5 sec 15x x Mild quad lag with SLR   LAQS  5 seconds 10x x    Seated knee with resistance band flexion  5sec 10x x Seated EOM  Orange band   NuStep  5 min   Seat 9/arm 10 Level 3    Step stretches 20 sec  3x x Knee flexion, hamstring and calf    Gentle distraction 30 sec 3x  On mat table   Seated marching, hip adduction, heel/toe raises    Ball for adduction   LAQ 5 sec 15x X Left LE seated EOM   Knee ROM   x AROM 3 to 105 degrees of flexion   Standing Heel/toe raises, marching, mini-squats  10x each x    3-way hip   10x  X Bilateral LEs, cues for less UE support   Seated hamstring stretch . Knee felex 15 sec x3     Rocker Board 2 directions Balance 30 sec Taps x 15  UE support needed   4 in step up 1 5 x Forward /lateral  Eccentric control with LLE on step throughout ex. Total Gym level J 1 x10 x Squeezing ball with partial squat mini                        ROM:     0 degrees to 115 degrees flexion left knee. Matrix. Seat8,  5min x Matrix with seat height 12. 1/2 revolutions. Specific Interventions Next Treatment: Progress strengthening and range of motion as able. Activity/Treatment Tolerance:  [x]  Patient tolerated treatment well  []  Patient limited by fatigue  [x]  Patient limited by pain   []  Patient limited by medical complications  []  Other:     Assessment: Corrected alignmnet during standing/wb ex in // bars for 3 way hip, heelraises, Added total gym exercise with mini squats. Manual therapy at end of session for soft tissue mobilization.  ROM 0 degrees -115 degrees flexion    GOALS:  Patient Goal: To have less knee pain, walk without walker and return to driving and work GOAL PARTIALLY MET Patient driving but not returned to work. Short Term Goal Time Frame: 4 weeks  Short Term Goals:  1. Patient to report of decreased pain levels 5/10 to no greater than 2/10 with standing and walking. GOAL NOT MET Patient having knee pain 4/10 and pain along joint line and pain into front of left leg. Difficulty sleeping due to pain. 2. Patient to demonstrate increased left knee ROM: from 6 degrees to 83 degrees to 0 degrees to 110 degrees knee flexion with increased ease with transfers, walking and negotiating steps. GOAL PARTIALLY MET  7 degrees to 115 degrees flexion. Active ROM 20 degrees, to 105 degrees. 3. Patient to demonstrate increased strength LLE at hip and knee musculature. Hip flexion 4/5, knee 4-/5. GOAL NOT MET Knee note 20 degree quad lag, MMT not completed on knee due to knee replacement surgery. Hip flexors 4-/5 abduction 4-/5.    4. Patient to ambulate from roller walker to straight cane with equal wt shift, knee flexion through swing phase and heel strike to foot flat. For community distances. GOAL MET Patient ambulating without assistive device. Antalgic gait with decreased wt shift through LLE. 5. Patient to negotiated 4 steps with single handrail reciprocally in order to negotiated stair steps at home. GOAL NOT MET Patient negoatiating stair step non reciprocally with bilateral hand rails. Long Term Goal Time Frame: 8 weeks  1. Patient to demonstrate knee ROM 0-110 degrees to 0 degrees to 120 degrees flexion with increased ease with stair steps reciprocally, curbs and getting in and out of car. GOAL NOT MET see STG ROM 7 degrees to 115 degrees left knee flexion. 2.Patient to ambulate without assistive device for limited community distances and household distances with normal gait pattern. LE functional index 18/80 to 50/80.  GOAL NOT MET LE Functional Index 34/80  3 Patient independent in HEP with progression in strength, ROM, gait and functional activities. ONGOING      Patient Education:   []  HEP/Education Completed: desensitization on medial and lateral L knee, SLR 3directions  [x]  No new Education completed  []  Reviewed Prior HEP    []  Patient verbalized and/or demonstrated understanding of education provided. []  Patient unable to verbalize and/or demonstrate understanding of education provided. Will continue education. PLAN:  Treatment Recommendations: Strengthening, Range of Motion, Balance Training, Functional Mobility Training, Transfer Training, Gait Training, Stair Training, Manual Therapy - Joint Manipulation, Pain Management, Home Exercise Program, Patient Education, Safety Education and Training and Self-Care Education and Training    []  Plan of care initiated. Plan to see patient 3 times per week for 8 weeks to address the treatment planned outlined above.   [x]  Continue with current plan of care  []  Modify plan of care as follows:    []  Hold pending physician visit  []  Discharge    Time In 1100   Time Out 1147       Timed Code Minutes: Minutes Units   ADL (88 [phone] [id number] 60)     Aquatics (76144)     Gait (99731)     Manual Therapy (75838)     Massage (04866)     Neuro (24051)     Th. Activities (57257)     Th. Exercise (21830) 47 3   Ultrasound (13138)     Ionto ([id number])     Manual E-Stim (57889)          TOTAL  TREATMENT TIME: 47minutes       Electronically Signed by: Chan Schumacher

## 2020-08-10 ENCOUNTER — HOSPITAL ENCOUNTER (OUTPATIENT)
Dept: PHYSICAL THERAPY | Age: 60
Setting detail: THERAPIES SERIES
Discharge: HOME | End: 2020-08-10
Payer: COMMERCIAL

## 2020-08-10 PROCEDURE — 97110 THERAPEUTIC EXERCISES: CPT

## 2020-08-10 NOTE — PROGRESS NOTES
[address] [facility]  PHYSICAL THERAPY  [] EVALUATION  [x] DAILY NOTE [] PROGRESS NOTE [] DISCHARGE NOTE    [x] OUTPATIENT REHABILITATION CENTER [facility]        Date: 8/10/2020  Patient Name:  Tena Del Real  : 1960  MRN: [id number]       Referring Practitioner Deloris Valencia DO   Diagnosis Unilateral primary osteoarthritis, left knee [M17.12]  Pain in left knee [M25.562]  Sprain of unspecified site of unspecified knee, initial encounter [S83.90XA]    Treatment Diagnosis S/p left TKR, difficulty walking, knee pain   Date of Evaluation 2020   Additional Pertinent History Left TKR 2020, 2 back surgeries lower lumbar with fusion, right great toe surgery 2020,       Functional Outcome Measure Used LE Functional Index   Functional Outcome Score       Insurance: Payor: R /  /  / ,     Authorization Information: 100 visits per calendar year combined for PT, OT, ST. No precertification. Aquatic yes, modalities yes   Visit # 13,  for next PN    Visits Allowed:    Recertification Date:    Physician Follow-Up: 2020     PT evaluate and treat, ex for ROM, strength, aquatic, gait analysis, home instructions, estim, cryotherapy, modalities as indicated. 3x per week for 6 weeks. SUBJECTIVE: Patient reporting of stiffness today at left knee. Notes pain and restlessness at night. Notes when he first gets up to walk more discomfort but does take a few steps to work it out. TREATMENT   Precautions: TKR TK protocol     Pain:  4/10  Left knee   TREATMENT                                  X in shaded column indicates Activity Completed Today   Modalities Parameters/  Location  Notes   Ice pack  Left knee anterior/posterior                 Manual Therapy Time/  Technique  Notes   Myofascial work to Left IT band and distal quad 5 minutes  Stretches completed after. Patient very tight and tender. (No Hawk  today). Completed after  to stretches.  Patient may and work GOAL PARTIALLY MET Patient driving but not returned to work. Short Term Goal Time Frame: 4 weeks  Short Term Goals:  1. Patient to report of decreased pain levels 5/10 to no greater than 2/10 with standing and walking. GOAL NOT MET Patient having knee pain 4/10 and pain along joint line and pain into front of left leg. Difficulty sleeping due to pain. 2. Patient to demonstrate increased left knee ROM: from 6 degrees to 83 degrees to 0 degrees to 110 degrees knee flexion with increased ease with transfers, walking and negotiating steps. GOAL PARTIALLY MET  7 degrees to 115 degrees flexion. Active ROM 20 degrees, to 105 degrees. 3. Patient to demonstrate increased strength LLE at hip and knee musculature. Hip flexion 4/5, knee 4-/5. GOAL NOT MET Knee note 20 degree quad lag, MMT not completed on knee due to knee replacement surgery. Hip flexors 4-/5 abduction 4-/5.    4. Patient to ambulate from roller walker to straight cane with equal wt shift, knee flexion through swing phase and heel strike to foot flat. For community distances. GOAL MET Patient ambulating without assistive device. Antalgic gait with decreased wt shift through LLE. 5. Patient to negotiated 4 steps with single handrail reciprocally in order to negotiated stair steps at home. GOAL NOT MET Patient negoatiating stair step non reciprocally with bilateral hand rails. Long Term Goal Time Frame: 8 weeks  1. Patient to demonstrate knee ROM 0-110 degrees to 0 degrees to 120 degrees flexion with increased ease with stair steps reciprocally, curbs and getting in and out of car. GOAL NOT MET see STG ROM 7 degrees to 115 degrees left knee flexion. 2.Patient to ambulate without assistive device for limited community distances and household distances with normal gait pattern. LE functional index 18/80 to 50/80.  GOAL NOT MET LE Functional Index 34/80  3 Patient independent in HEP with progression in strength, ROM, gait and functional activities. ONGOING      Patient Education: Continue to apply ice and HEP. Concentrate on alignment and gait pattern. []  HEP/Education Completed: desensitization on medial and lateral L knee, SLR 3directions  [x]  No new Education completed  []  Reviewed Prior HEP    []  Patient verbalized and/or demonstrated understanding of education provided. []  Patient unable to verbalize and/or demonstrate understanding of education provided. Will continue education. PLAN:  Treatment Recommendations: Strengthening, Range of Motion, Balance Training, Functional Mobility Training, Transfer Training, Gait Training, Stair Training, Manual Therapy - Joint Manipulation, Pain Management, Home Exercise Program, Patient Education, Safety Education and Training and Self-Care Education and Training    []  Plan of care initiated. Plan to see patient 3 times per week for 8 weeks to address the treatment planned outlined above.   [x]  Continue with current plan of care  []  Modify plan of care as follows:    []  Hold pending physician visit  []  Discharge    Time In 0800   Time Out 0843       Timed Code Minutes: Minutes Units   ADL (88 [id number] 60)     Aquatics (64748)     Gait (72913)     Manual Therapy (03008)     Massage (66161)     Neuro (70210)     Th. Activities (34719)     Th. Exercise (67741) 43 3   Ultrasound (72672)     Ionto (54472)     Manual E-Stim (74044)          TOTAL  TREATMENT TIME: 43minutes       Electronically Signed by: Ching Tsang

## 2020-08-13 ENCOUNTER — HOSPITAL ENCOUNTER (OUTPATIENT)
Dept: PHYSICAL THERAPY | Age: 60
Setting detail: THERAPIES SERIES
Discharge: HOME | End: 2020-08-13
Payer: COMMERCIAL

## 2020-08-13 PROCEDURE — 97110 THERAPEUTIC EXERCISES: CPT

## 2020-08-13 NOTE — PROGRESS NOTES
[address] [facility]  PHYSICAL THERAPY  [] EVALUATION  [x] DAILY NOTE [] PROGRESS NOTE [] DISCHARGE NOTE    [x] OUTPATIENT REHABILITATION [facility]        Date: 2020  Patient Name:  Leana Peck  : 1960  MRN: [id number]       Referring Practitioner Rakesh Doran DO   Diagnosis Unilateral primary osteoarthritis, left knee [M17.12]  Pain in left knee [M25.562]  Sprain of unspecified site of unspecified knee, initial encounter [S83.90XA]    Treatment Diagnosis S/p left TKR, difficulty walking, knee pain   Date of Evaluation 2020   Additional Pertinent History Left TKR 2020, 2 back surgeries lower lumbar with fusion, right great toe surgery 2020,       Functional Outcome Measure Used LE Functional Index   Functional Outcome Score       Insurance: Payor: R /  /  / ,     Authorization Information: 100 visits per calendar year combined for PT, OT, ST. No precertification. Aquatic yes, modalities yes   Visit # 13,  for next PN    Visits Allowed:    Recertification Date:    Physician Follow-Up: 2020     PT evaluate and treat, ex for ROM, strength, aquatic, gait analysis, home instructions, estim, cryotherapy, modalities as indicated. 3x per week for 6 weeks. SUBJECTIVE: Patient reporting of stiffness today at left knee. Notes pain and restlessness at night. Notes when he first gets up to walk more discomfort but does take a few steps to work it out. Pain level 4/10 today. Nerve pain at night continues in which he takes Gabapentin for. Restless sleep and nerve pain to ankle and pain inside of knee. Follows up with physician on 2020.      TREATMENT   Precautions: TKR TK protocol     Pain:  4/10  Left knee   TREATMENT                                  X in shaded column indicates Activity Completed Today   Modalities Parameters/  Location  Notes   Ice pack  Left knee anterior/posterior                 Manual Therapy Time/  Technique  Notes Myofascial work to Left IT band and distal quad 5 minutes x Stretches completed after. Patient very tight and tender. (No Hawk  today). Completed after  to stretches. Patellar mobs      Exercise/  Intervention Sets/  Sec Reps  Notes   Quad sets  5 sec 10x x With towel roll under heel   Prone knee flexion stretch with strap 5 sec X 10     Heel slides 5 sec 15x x    SAQ's  5 sec 15x  Lacking full knee extension 20 degrees   SLR  3directions 5 sec 15x  Mild quad lag with SLR   LAQS  5 seconds 10x x    Seated knee with resistance band flexion  5sec 10x  Seated EOM  Orange band          Step stretches 20 sec  3x x Knee flexion, hamstring and calf    Gentle distraction 30 sec 3x  On mat table   Seated marching, hip adduction, heel/toe raises    Ball for adduction   LAQ 5 sec 15x  Left LE seated EOM          Standing Heel/toe raises, marching, mini-squats  15x each x    3-way hip   10x   Bilateral LEs, cues for less UE support   Seated hamstring stretch . Knee flexion 15 sec x3 x    Rocker Board 2 directions Balance 30 sec Taps x 15 x Light hand hovering     4 in step up 1 5x x Forward and lateral    Eccentric control with LLE on step throughout ex. Total Gym level J 1 x15 x Squeezing ball with partial squat mini                        ROM:     3 degrees to 118-120 degrees flexion left knee. Sports Art [employer],  5min x 1/2 revolutions at first. Then cycle around. Specific Interventions Next Treatment: Progress strengthening and range of motion as able. Activity/Treatment Tolerance:pain level 4/10 with most pain on medial knee region. Mild swelling continues. [x]  Patient tolerated treatment well  []  Patient limited by fatigue  [x]  Patient limited by pain   []  Patient limited by medical complications  []  Other:     Assessment: ROM 3 degrees to 118-120 degrees left knee flexion . Improving knee ROM actively and active assistively. Strength decreased at quad musculature.  Continues to have for limited community distances and household distances with normal gait pattern. LE functional index 18/80 to 50/80. GOAL NOT MET LE Functional Index 34/80  3 Patient independent in HEP with progression in strength, ROM, gait and functional activities. ONGOING      Patient Education: Continue to apply ice and HEP. Concentrate on alignment and gait pattern. []  HEP/Education Completed: desensitization on medial and lateral L knee, SLR 3directions  [x]  No new Education completed  []  Reviewed Prior HEP    []  Patient verbalized and/or demonstrated understanding of education provided. []  Patient unable to verbalize and/or demonstrate understanding of education provided. Will continue education. PLAN:  Treatment Recommendations: Strengthening, Range of Motion, Balance Training, Functional Mobility Training, Transfer Training, Gait Training, Stair Training, Manual Therapy - Joint Manipulation, Pain Management, Home Exercise Program, Patient Education, Safety Education and Training and Self-Care Education and Training    []  Plan of care initiated. Plan to see patient 3 times per week for 8 weeks to address the treatment planned outlined above.   [x]  Continue with current plan of care  []  Modify plan of care as follows:    []  Hold pending physician visit  []  Discharge    Time In 0820   Time Out 0858       Timed Code Minutes: Minutes Units   ADL (88 [id number] 60)     Aquatics (45394)     Gait (73977)     Manual Therapy (38266)     Massage (69419)     Neuro (79426)     Th. Activities (52212)     Th. Exercise (44424) 38 3   Ultrasound (34631)     Ionto (43234)     Manual E-Stim (95552)          TOTAL  TREATMENT TIME: 38 minutes       Electronically Signed by: Jory Vanegas

## 2020-08-19 ENCOUNTER — HOSPITAL ENCOUNTER (OUTPATIENT)
Dept: PHYSICAL THERAPY | Age: 60
Setting detail: THERAPIES SERIES
Discharge: HOME | End: 2020-08-19
Payer: COMMERCIAL

## 2020-08-19 PROCEDURE — 97110 THERAPEUTIC EXERCISES: CPT

## 2020-08-19 NOTE — PROGRESS NOTES
[address] [facility]  PHYSICAL THERAPY  [] EVALUATION  [x] DAILY NOTE [] PROGRESS NOTE [] DISCHARGE NOTE    [x] OUTPATIENT REHABILITATION [facility]        Date: 2020  Patient Name:  You Woodard  : 1960  MRN: [id number]       Referring Practitioner Colon Meals, DO   Diagnosis Unilateral primary osteoarthritis, left knee [M17.12]  Pain in left knee [M25.562]  Sprain of unspecified site of unspecified knee, initial encounter [S83.90XA]    Treatment Diagnosis S/p left TKR, difficulty walking, knee pain   Date of Evaluation 2020   Additional Pertinent History Left TKR 2020, 2 back surgeries lower lumbar with fusion, right great toe surgery 2020,       Functional Outcome Measure Used LE Functional Index   Functional Outcome Score       Insurance: Payor: [facility] /  /  / ,     Authorization Information: 100 visits per calendar year combined for PT, OT, ST. No precertification. Aquatic yes, modalities yes   Visit # 14, / for next PN    Visits Allowed:    Recertification Date:    Physician Follow-Up: 2020     PT evaluate and treat, ex for ROM, strength, aquatic, gait analysis, home instructions, estim, cryotherapy, modalities as indicated. 3x per week for 6 weeks. SUBJECTIVE: Pt stated he went to Dr this week.  was happy with ROM.  took small amount of fluid off lateral L knee.  thinks nerve pain is coming from back and set up appt with back surgeon. Pt stated after fluid was off he attempted steps going up was able to alternate slowly. Pt still doesn't trust L knee coming down and feels L knee has filled up with fluid again. Pt continues to have pain from L knee down to ankle at night disturbing sleep.      TREATMENT   Precautions: TKR TK protocol     Pain:  4/10  Left knee   TREATMENT                                  X in shaded column indicates Activity Completed Today   Modalities Parameters/  Location  Notes   Ice pack  Left knee anterior/posterior x                Manual Therapy Time/  Technique  Notes   Myofascial work to Left IT band and distal quad 5 minutes x Stretches completed after. Patient very tight and tender. (No Hawk  today). Completed after  to stretches. Patellar mobs      Exercise/  Intervention Sets/  Sec Reps  Notes   Quad sets  5 sec 10x x With towel roll under heel   Prone knee flexion stretch with strap 5 sec X 10     Heel slides 5 sec 15x x    SAQ's with 3# 5 sec 10x x    SLR  3directions 5 sec 15x x Mild quad lag with SLR   LAQS  5 seconds 10x x    Seated knee with resistance band flexion  5sec 10x  Seated EOM  Orange band          Step stretches 20 sec  3x x Knee flexion, hamstring and calf    Gentle distraction 30 sec 3x  On mat table   Seated marching, hip adduction, heel/toe raises    Ball for adduction   LAQ 5 sec 15x  Left LE seated EOM          Standing on blue foam; Heel/toe raises, marching, mini-squats  15x each x    3-way hip   15x  x Bilateral LEs, cues for less UE support   Seated hamstring stretch . Knee flexion 15 sec x3     Rocker Board 2 directions Balance 30 sec Taps x 15 x Light hand hovering     6 in step up 1 10x x Forward and lateral with B UE support    Eccentric 4in     Total Gym level J 1 x15 x Squeezing ball with partial squat mini, heel lifts                        ROM:     3 degrees to 118-120 degrees flexion left knee. Sports Art [employer],  5min x 1/2 revolutions at first. Then cycle around. Level 3     Specific Interventions Next Treatment: Progress strengthening and range of motion as able. Activity/Treatment Tolerance:pain level 4/10 with most pain on medial knee region. Mild swelling continues. [x]  Patient tolerated treatment well  []  Patient limited by fatigue  [x]  Patient limited by pain   []  Patient limited by medical complications  []  Other:     Assessment: Pt continues to have edema above L knee joint.  Fluid drained 2 days ago and is continues to have some swelling especially after standing exercises. Pt to [facility] August [facility]     GOALS:  Patient Goal: To have less knee pain, walk without walker and return to driving and work GOAL PARTIALLY MET Patient driving but not returned to work. Short Term Goal Time Frame: 4 weeks  Short Term Goals:  1. Patient to report of decreased pain levels 5/10 to no greater than 2/10 with standing and walking. GOAL NOT MET Patient having knee pain 4/10 and pain along joint line and pain into front of left leg. Difficulty sleeping due to pain. 2. Patient to demonstrate increased left knee ROM: from 6 degrees to 83 degrees to 0 degrees to 110 degrees knee flexion with increased ease with transfers, walking and negotiating steps. GOAL PARTIALLY MET  7 degrees to 115 degrees flexion. Active ROM 20 degrees, to 105 degrees. 3. Patient to demonstrate increased strength LLE at hip and knee musculature. Hip flexion 4/5, knee 4-/5. GOAL NOT MET Knee note 20 degree quad lag, MMT not completed on knee due to knee replacement surgery. Hip flexors 4-/5 abduction 4-/5.    4. Patient to ambulate from roller walker to straight cane with equal wt shift, knee flexion through swing phase and heel strike to foot flat. For community distances. GOAL MET Patient ambulating without assistive device. Antalgic gait with decreased wt shift through LLE. 5. Patient to negotiated 4 steps with single handrail reciprocally in order to negotiated stair steps at home. GOAL NOT MET Patient negoatiating stair step non reciprocally with bilateral hand rails. Long Term Goal Time Frame: 8 weeks  1. Patient to demonstrate knee ROM 0-110 degrees to 0 degrees to 120 degrees flexion with increased ease with stair steps reciprocally, curbs and getting in and out of car. GOAL NOT MET see STG ROM 7 degrees to 115 degrees left knee flexion.    2.Patient to ambulate without assistive device for limited community distances and household distances with normal gait pattern. LE functional index 18/80 to 50/80. GOAL NOT MET LE Functional Index 34/80  3 Patient independent in HEP with progression in strength, ROM, gait and functional activities. ONGOING      Patient Education: Continue to apply ice and HEP. Concentrate on alignment and gait pattern. []  HEP/Education Completed: desensitization on medial and lateral L knee, SLR 3directions  [x]  No new Education completed  []  Reviewed Prior HEP    []  Patient verbalized and/or demonstrated understanding of education provided. []  Patient unable to verbalize and/or demonstrate understanding of education provided. Will continue education. PLAN:  Treatment Recommendations: Strengthening, Range of Motion, Balance Training, Functional Mobility Training, Transfer Training, Gait Training, Stair Training, Manual Therapy - Joint Manipulation, Pain Management, Home Exercise Program, Patient Education, Safety Education and Training and Self-Care Education and Training    []  Plan of care initiated. Plan to see patient 3 times per week for 8 weeks to address the treatment planned outlined above.   [x]  Continue with current plan of care  []  Modify plan of care as follows:    []  Hold pending physician visit  []  Discharge    Time In 1545   Time Out 1635       Timed Code Minutes: Minutes Units   ADL (88 [id number] 60)     Aquatics (91482)     Gait (69883)     Manual Therapy (37563)     Massage (35984)     Neuro (27249)     Th. Activities (14211)     Th. Exercise (34247) 40 3   Ultrasound (39119)     Ionto (57460)     Manual E-Stim (04443)          TOTAL  TREATMENT TIME: 50 minutes       Electronically Signed by: Netta Olsen

## 2020-08-21 ENCOUNTER — HOSPITAL ENCOUNTER (OUTPATIENT)
Dept: PHYSICAL THERAPY | Age: 60
Setting detail: THERAPIES SERIES
Discharge: HOME | End: 2020-08-21
Payer: COMMERCIAL

## 2020-08-21 PROCEDURE — 97110 THERAPEUTIC EXERCISES: CPT

## 2020-08-21 NOTE — PROGRESS NOTES
shaded column indicates Activity Completed Today   Modalities Parameters/  Location  Notes   Ice pack  Left knee anterior/posterior                 Manual Therapy Time/  Technique  Notes   Myofascial work to Left IT band and distal quad 5 minutes  Stretches completed after. Patient very tight and tender. (No Hawk  today). Completed after  to stretches. Patellar mobs      Exercise/  Intervention Sets/  Sec Reps  Notes   Quad sets  5 sec 10x xHEP With towel roll under heel   Prone knee flexion stretch with strap 5 sec X 10     Heel slides with and without strap 5 sec 10x xHEP    SAQ's with  5 sec 10x     SLR  3directions 5 sec 15x HEP Mild quad lag with SLR   LAQS  5 seconds 10x     Seated knee with resistance band flexion  5sec 10x  Seated EOM  Orange band          Step stretches 20 sec  3x xHEP Knee flexion, hamstring and calf    Gentle distraction 30 sec 3x  On mat table   Seated marching, hip adduction, heel/toe raises    Ball for adduction   LAQ 5 sec 15x  Left LE seated EOM          Standing on blue foam; Heel/toe raises, marching, mini-squats  15x each HEP without foam    3-way hip   15x  HEP Bilateral LEs, cues for less UE support   Seated hamstring stretch . Knee flexion 15 sec x3 HEP    Rocker Board 2 directions Balance 30 sec Taps x 15 x Light hand hovering     6 in step up 1 10x HEP Forward and lateral with B UE support    Eccentric 4in     Total Gym level J 1 x15  Squeezing ball with partial squat mini, heel lifts                        ROM: AA   x 0 degrees to 125  degrees flexion left knee. Sports Art [employer],  5min x 1/2 revolutions at first. Then cycle around. Level 3     Specific Interventions Next Treatment: Reassessment, therap ex     Activity/Treatment Tolerance:pain level 4/10 with most pain on medial knee region. Mild swelling continues.    [x]  Patient tolerated treatment well  []  Patient limited by fatigue  [x]  Patient limited by pain   []  Patient limited by medical complications  []  Other:     Assessment: Reassessment refer to goal summary. AAROM 0 degrees to 125 degrees left knee, AROM 3 degrees to 120 degrees flexion left knee. Note decreased strength left hip flexors 4/5,     GOALS:  Patient Goal: To have less knee pain, walk without walker and return to driving and work GOAL  MET Patient released for work by his physician. Plans to return next week. Walking without assistive device. Walking in community independently with fairly normal gait with mild decreased wt shift through LLE>     Short Term Goal Time Frame: 4 weeks  Short Term Goals:  1. Patient to report of decreased pain levels 5/10 to no greater than 2/10 with standing and walking. GOAL NOT MET Patient having knee pain 4/10 and pain along joint line and pain into front of left leg. Difficulty sleeping due to pain. 2. Patient to demonstrate increased left knee ROM: from 6 degrees to 83 degrees to 0 degrees to 110 degrees knee flexion with increased ease with transfers, walking and negotiating steps. GOAL MET ROM Active 3 degrees to 120 degrees flexion, AAROM 0 degrees to 125-126 degrees flexion. 3. Patient to demonstrate increased strength LLE at hip and knee musculature. Hip flexion 4/5, knee 4-/5. GOAL  MET Knee note 3 degree quad lag, MMT not completed on knee due to knee replacement surgery. Hip flexors 45 abduction 4/5.    4. Patient to ambulate from roller walker to straight cane with equal wt shift, knee flexion through swing phase and heel strike to foot flat. For community distances. GOAL MET Patient ambulating without assistive device. Antalgic gait with decreased wt shift through LLE. 5. Patient to negotiated 4 steps with single handrail reciprocally in order to negotiated stair steps at home. GOAL NOT MET Patient negoatiating stair step non reciprocally with bilateral hand rails. Long Term Goal Time Frame: 8 weeks  1.  Patient to demonstrate knee ROM 0-110 degrees to 0 degrees to 120 degrees flexion with increased ease with stair steps reciprocally, curbs and getting in and out of car. GOAL  MET3 degrees to 120  degrees left knee flexion. AAROM 0 degrees to 125 degrees flexion  2. Patient to ambulate without assistive device for limited community distances and household distances with normal gait pattern. LE functional index 18/80 to 50/80. GOAL NOT MET LE Functional Index 40/80  3 Patient independent in HEP with progression in strength, ROM, gait and functional activities. GOAL MET Independent with HEP       Patient Education: Continue to apply ice and HEP. Concentrate on alignment and gait pattern. Continue with HEP as instructed  [x]  HEP/Education Completed:  []  No new Education completed  []  Reviewed Prior HEP    []  Patient verbalized and/or demonstrated understanding of education provided. []  Patient unable to verbalize and/or demonstrate understanding of education provided. Will continue education. PLAN:  Treatment Recommendations: Strengthening, Range of Motion, Balance Training, Functional Mobility Training, Transfer Training, Gait Training, Stair Training, Manual Therapy - Joint Manipulation, Pain Management, Home Exercise Program, Patient Education, Safety Education and Training and Self-Care Education and Training    []  Plan of care initiated. Plan to see patient 3 times per week for 8 weeks to address the treatment planned outlined above.   []  Continue with current plan of care  []  Modify plan of care as follows:    []  Hold pending physician visit  [x]  Discharge    Time In 0933   Time Out 1013       Timed Code Minutes: Minutes Units   ADL (88 [id number] 60)     Aquatics (07413)     Gait (81884)     Manual Therapy (62259)     Massage (35652)     Neuro (66522)     Th. Activities (16769)     Th. Exercise (22047) 40 3   Ultrasound (07396)     Ionto (38561)     Manual E-Stim (99529)          TOTAL  TREATMENT TIME: 40 minutes       Electronically Signed by: Avery Pritchard

## 2020-09-22 ENCOUNTER — HOSPITAL ENCOUNTER (OUTPATIENT)
Dept: CT IMAGING | Age: 60
Discharge: HOME | End: 2020-09-22
Payer: COMMERCIAL

## 2020-09-22 PROCEDURE — 72131 CT LUMBAR SPINE W/O DYE: CPT

## 2020-10-27 ENCOUNTER — OFFICE VISIT (OUTPATIENT)
Dept: FAMILY MEDICINE CLINIC | Age: 60
End: 2020-10-27
Payer: COMMERCIAL

## 2020-10-27 VITALS
WEIGHT: 191 LBS | RESPIRATION RATE: 16 BRPM | SYSTOLIC BLOOD PRESSURE: 120 MMHG | TEMPERATURE: 96.9 F | HEART RATE: 67 BPM | OXYGEN SATURATION: 97 % | DIASTOLIC BLOOD PRESSURE: 74 MMHG | BODY MASS INDEX: 24.52 KG/M2

## 2020-10-27 PROCEDURE — 99214 OFFICE O/P EST MOD 30 MIN: CPT | Performed by: FAMILY MEDICINE

## 2020-10-27 RX ORDER — ESCITALOPRAM OXALATE 10 MG/1
10 TABLET ORAL DAILY
Qty: 30 TABLET | Refills: 5 | Status: SHIPPED | OUTPATIENT
Start: 2020-10-27 | End: 2021-04-05

## 2020-10-27 RX ORDER — GABAPENTIN 300 MG/1
2 CAPSULE ORAL EVERY 8 HOURS
COMMUNITY
Start: 2020-10-02

## 2020-11-01 ASSESSMENT — ENCOUNTER SYMPTOMS
SORE THROAT: 0
SINUS PRESSURE: 0
ABDOMINAL PAIN: 0
NAUSEA: 0
DIARRHEA: 0
SHORTNESS OF BREATH: 0
RHINORRHEA: 0
CONSTIPATION: 0
EYE PAIN: 0
COUGH: 0
ABDOMINAL DISTENTION: 0

## 2020-11-01 NOTE — PROGRESS NOTES
[address] [place] [facility] [address]  Dept: [phone]  Dept Fax: [phone]  Loc: [phone]  PROGRESS NOTE      VisitDate: 10/27/2020    Loman Habermann is a 60 y.o. male who presents today for:     Chief Complaint   Patient presents with    Depression    Other     declines flu shot. Subjective:  HPI  Patient comes in feeling depressed. Mood swings anxious occasionally but feels sullen and depressed a lot. Disrupted sleep disrupted appetite, no suicidal thoughts    Review of Systems   Constitutional: Negative for appetite change and fever. HENT: Negative for congestion, ear pain, postnasal drip, rhinorrhea, sinus pressure and sore throat. Eyes: Negative for pain and visual disturbance. Respiratory: Negative for cough and shortness of breath. Cardiovascular: Negative for chest pain. Gastrointestinal: Negative for abdominal distention, abdominal pain, constipation, diarrhea and nausea. Genitourinary: Negative for dysuria, frequency and urgency. Musculoskeletal: Negative for arthralgias. Skin: Negative for rash. Neurological: Negative for dizziness. Psychiatric/Behavioral: Positive for decreased concentration and dysphoric mood.      Past Medical History:   Diagnosis Date    Dog bite of calf 3/10    right calf    Hyperlipidemia     Renal calculi     Umbilical hernia       Past Surgical History:   Procedure Laterality Date    BACK SURGERY  2015    L-4-5 fusion Dr.Kelly Isabel    HERNIA REPAIR  [phone]    Umbilical Hernia Repair     Family History   Problem Relation Age of Onset    Cancer Mother         breast    Cancer Father         brain     Social History     Tobacco Use    Smoking status: Former Smoker     Last attempt to quit: 1992     Years since quittin.8    Smokeless tobacco: Former User   Substance Use Topics    Alcohol use: Yes     Comment: socially      Current Outpatient Medications   Medication Sig Dispense Refill    Naproxen Sodium (ALEVE PO) Take by mouth daily as needed      gabapentin (NEURONTIN) 300 MG capsule Take 2 capsules by mouth every 8 hours.  escitalopram (LEXAPRO) 10 MG tablet Take 1 tablet by mouth daily 30 tablet 5    Multiple Vitamin (MULTI-VITAMIN DAILY PO) Take by mouth       No current facility-administered medications for this visit. No Known Allergies  Health Maintenance   Topic Date Due    Hepatitis C screen  1960    HIV screen  12/08/1974    Shingles Vaccine (1 of 2) 12/08/2009    DTaP/Tdap/Td vaccine (2 - Td) 01/01/2015    A1C test (Diabetic or Prediabetic)  10/11/2018    Flu vaccine (1) 09/01/2020    Lipid screen  11/10/2022    Colon cancer screen colonoscopy  07/08/2023    Hepatitis A vaccine  Aged Out    Hepatitis B vaccine  Aged Out    Hib vaccine  Aged Out    Meningococcal (ACWY) vaccine  Aged Out    Pneumococcal 0-64 years Vaccine  Aged Out         Objective:     Physical Exam  Constitutional:       General: He is not in acute distress. Appearance: He is well-developed. He is not diaphoretic. HENT:      Head: Normocephalic and atraumatic. Right Ear: External ear normal.      Left Ear: External ear normal.   Eyes:      Conjunctiva/sclera: Conjunctivae normal.   Neck:      Vascular: No JVD. Cardiovascular:      Rate and Rhythm: Normal rate and regular rhythm. Heart sounds: Normal heart sounds. Pulmonary:      Effort: Pulmonary effort is normal.      Breath sounds: Normal breath sounds. No wheezing or rales. Musculoskeletal:         General: No tenderness. Skin:     General: Skin is warm and dry. Coloration: Skin is not pale. Neurological:      Mental Status: He is alert and oriented to person, place, and time. /74   Pulse 67   Temp 96.9 °F (36.1 °C) (Infrared)   Resp 16   Wt 191 lb (86.6 kg)   SpO2 97%   BMI 24.52 kg/m²       Impression/Plan:  1.  Depression, unspecified depression type      Requested Prescriptions     Signed Prescriptions Disp Refills    escitalopram (LEXAPRO) 10 MG tablet 30 tablet 5     Sig: Take 1 tablet by mouth daily     No orders of the defined types were placed in this encounter. 25 minutes face-to-face time and over for percent of that time spent on counseling regarding depression, different treatments available, medications side effects course and mechanism of action time to onset. Patient giveneducational materials - see patient instructions. Discussed use, benefit, and side effects of prescribed medications. All patient questions answered. Pt voiced understanding. Reviewed health maintenance. Patient agreedwith treatment plan. Follow up as directed. **This report has been created using voice recognition software. It may contain minor errorswhich are inherent in voice recognition technology. **       Electronically signed by Bobo Rivero MD on 11/1/2020 at 10:49 AM

## 2020-11-06 ENCOUNTER — HOSPITAL ENCOUNTER (OUTPATIENT)
Dept: MRI IMAGING | Age: 60
Discharge: HOME | End: 2020-11-06
Payer: COMMERCIAL

## 2020-11-06 LAB — POC CREATININE WHOLE BLOOD: 0.9 MG/DL (ref 0.5–1.2)

## 2020-11-06 PROCEDURE — A9579 GAD-BASE MR CONTRAST NOS,1ML: HCPCS | Performed by: ORTHOPAEDIC SURGERY

## 2020-11-06 PROCEDURE — 82565 ASSAY OF CREATININE: CPT

## 2020-11-06 PROCEDURE — 6360000004 HC RX CONTRAST MEDICATION: Performed by: ORTHOPAEDIC SURGERY

## 2020-11-06 PROCEDURE — 72158 MRI LUMBAR SPINE W/O & W/DYE: CPT

## 2020-11-06 RX ADMIN — GADOTERIDOL 15 ML: 279.3 INJECTION, SOLUTION INTRAVENOUS at 15:45

## 2020-11-24 ENCOUNTER — OFFICE VISIT (OUTPATIENT)
Dept: FAMILY MEDICINE CLINIC | Age: 60
End: 2020-11-24
Payer: COMMERCIAL

## 2020-11-24 VITALS
DIASTOLIC BLOOD PRESSURE: 80 MMHG | BODY MASS INDEX: 23.88 KG/M2 | HEART RATE: 97 BPM | RESPIRATION RATE: 16 BRPM | OXYGEN SATURATION: 97 % | WEIGHT: 186 LBS | SYSTOLIC BLOOD PRESSURE: 112 MMHG | TEMPERATURE: 97.2 F

## 2020-11-24 PROCEDURE — 99213 OFFICE O/P EST LOW 20 MIN: CPT | Performed by: FAMILY MEDICINE

## 2020-12-01 ASSESSMENT — ENCOUNTER SYMPTOMS
NAUSEA: 0
DIARRHEA: 0
SINUS PRESSURE: 0
EYE PAIN: 0
SHORTNESS OF BREATH: 0
ABDOMINAL PAIN: 0
COUGH: 0
SORE THROAT: 0
ABDOMINAL DISTENTION: 0
CONSTIPATION: 0
RHINORRHEA: 0

## 2020-12-01 NOTE — PROGRESS NOTES
[address] [place] [facility] [address]  Dept: [phone]  Dept Fax: [phone]  Loc: [phone]  PROGRESS NOTE      VisitDate: 2020    Shaye Huff is a 60 y.o. male who presents today for:     Chief Complaint   Patient presents with    1 Month Follow-Up     depression follow up     Flu Vaccine     declines         Subjective:  HPI  Follow-up depression. Started on Lexapro doing well feels its been helpful no negative side effects. History of hyperlipidemia. He is due for blood work    Review of Systems   Constitutional: Negative for appetite change and fever. HENT: Negative for congestion, ear pain, postnasal drip, rhinorrhea, sinus pressure and sore throat. Eyes: Negative for pain and visual disturbance. Respiratory: Negative for cough and shortness of breath. Cardiovascular: Negative for chest pain. Gastrointestinal: Negative for abdominal distention, abdominal pain, constipation, diarrhea and nausea. Genitourinary: Negative for dysuria, frequency and urgency. Musculoskeletal: Negative for arthralgias. Skin: Negative for rash. Neurological: Negative for dizziness. Past Medical History:   Diagnosis Date    Dog bite of calf 3/10    right calf    Hyperlipidemia     Renal calculi     Umbilical hernia       Past Surgical History:   Procedure Laterality Date    BACK SURGERY  2015    L-4-5 fusion Dr.Kelly Isabel    HERNIA REPAIR  [id number]    Umbilical Hernia Repair     Family History   Problem Relation Age of Onset    Cancer Mother         breast    Cancer Father         brain     Social History     Tobacco Use    Smoking status: Former Smoker     Packs/day: 0.50     Years: 10.00     Pack years: 5.00     Types: Cigarettes     Start date:      Last attempt to quit: 1992     Years since quittin.8    Smokeless tobacco: Former User   Substance Use Topics    Alcohol use:  Yes Comment: socially      Current Outpatient Medications   Medication Sig Dispense Refill    Naproxen Sodium (ALEVE PO) Take by mouth daily as needed      gabapentin (NEURONTIN) 300 MG capsule Take 2 capsules by mouth every 8 hours.  escitalopram (LEXAPRO) 10 MG tablet Take 1 tablet by mouth daily 30 tablet 5    Multiple Vitamin (MULTI-VITAMIN DAILY PO) Take by mouth       No current facility-administered medications for this visit. No Known Allergies  Health Maintenance   Topic Date Due    Hepatitis C screen  1960    HIV screen  12/08/1974    Shingles Vaccine (1 of 2) 12/08/2009    DTaP/Tdap/Td vaccine (2 - Td) 01/01/2015    A1C test (Diabetic or Prediabetic)  10/11/2018    Flu vaccine (1) 09/01/2020    Lipid screen  11/10/2022    Colon cancer screen colonoscopy  07/08/2023    Hepatitis A vaccine  Aged Out    Hepatitis B vaccine  Aged Out    Hib vaccine  Aged Out    Meningococcal (ACWY) vaccine  Aged Out    Pneumococcal 0-64 years Vaccine  Aged Out         Objective:     Physical Exam  Constitutional:       General: He is not in acute distress. Appearance: He is well-developed. He is not diaphoretic. HENT:      Head: Normocephalic and atraumatic. Right Ear: External ear normal.      Left Ear: External ear normal.   Eyes:      Conjunctiva/sclera: Conjunctivae normal.   Neck:      Vascular: No JVD. Cardiovascular:      Rate and Rhythm: Normal rate and regular rhythm. Heart sounds: Normal heart sounds. Pulmonary:      Effort: Pulmonary effort is normal.      Breath sounds: Normal breath sounds. No wheezing or rales. Musculoskeletal:         General: No tenderness. Skin:     General: Skin is warm and dry. Coloration: Skin is not pale. Neurological:      Mental Status: He is alert and oriented to person, place, and time.        /80   Pulse 97   Temp 97.2 °F (36.2 °C) (Temporal)   Resp 16   Wt 186 lb (84.4 kg)   SpO2 97%   BMI 23.88 kg/m² Impression/Plan:  1. Hyperlipidemia, unspecified hyperlipidemia type    2. Adjustment disorder with mixed anxiety and depressed mood      Requested Prescriptions      No prescriptions requested or ordered in this encounter     Orders Placed This Encounter   Procedures    Lipid Panel     Standing Status:   Future     Standing Expiration Date:   11/24/2021     Order Specific Question:   Is Patient Fasting?/# of Hours     Answer:   yes/12    Comprehensive Metabolic Panel     Standing Status:   Future     Standing Expiration Date:   11/24/2021       Patient giveneducational materials - see patient instructions. Discussed use, benefit, and side effects of prescribed medications. All patient questions answered. Pt voiced understanding. Reviewed health maintenance. Patient agreedwith treatment plan. Follow up as directed. **This report has been created using voice recognition software. It may contain minor errorswhich are inherent in voice recognition technology. **       Electronically signed by Alex Yoder MD on 12/1/2020 at 11:35 AM

## 2021-04-05 RX ORDER — ESCITALOPRAM OXALATE 10 MG/1
TABLET ORAL
Qty: 30 TABLET | Refills: 2 | Status: SHIPPED | OUTPATIENT
Start: 2021-04-05

## 2023-01-24 ENCOUNTER — APPOINTMENT (RX ONLY)
Dept: URBAN - METROPOLITAN AREA CLINIC 114 | Facility: CLINIC | Age: 63
Setting detail: DERMATOLOGY
End: 2023-01-24

## 2023-01-24 DIAGNOSIS — L81.8 OTHER SPECIFIED DISORDERS OF PIGMENTATION: ICD-10-CM

## 2023-01-24 DIAGNOSIS — D22 MELANOCYTIC NEVI: ICD-10-CM

## 2023-01-24 DIAGNOSIS — L21.8 OTHER SEBORRHEIC DERMATITIS: ICD-10-CM | Status: INADEQUATELY CONTROLLED

## 2023-01-24 DIAGNOSIS — L81.4 OTHER MELANIN HYPERPIGMENTATION: ICD-10-CM

## 2023-01-24 DIAGNOSIS — Z71.89 OTHER SPECIFIED COUNSELING: ICD-10-CM

## 2023-01-24 DIAGNOSIS — D18.0 HEMANGIOMA: ICD-10-CM

## 2023-01-24 DIAGNOSIS — L57.8 OTHER SKIN CHANGES DUE TO CHRONIC EXPOSURE TO NONIONIZING RADIATION: ICD-10-CM | Status: INADEQUATELY CONTROLLED

## 2023-01-24 DIAGNOSIS — L82.1 OTHER SEBORRHEIC KERATOSIS: ICD-10-CM

## 2023-01-24 DIAGNOSIS — L90.5 SCAR CONDITIONS AND FIBROSIS OF SKIN: ICD-10-CM

## 2023-01-24 DIAGNOSIS — L85.3 XEROSIS CUTIS: ICD-10-CM

## 2023-01-24 PROBLEM — D23.71 OTHER BENIGN NEOPLASM OF SKIN OF RIGHT LOWER LIMB, INCLUDING HIP: Status: ACTIVE | Noted: 2023-01-24

## 2023-01-24 PROBLEM — D18.01 HEMANGIOMA OF SKIN AND SUBCUTANEOUS TISSUE: Status: ACTIVE | Noted: 2023-01-24

## 2023-01-24 PROBLEM — D22.5 MELANOCYTIC NEVI OF TRUNK: Status: ACTIVE | Noted: 2023-01-24

## 2023-01-24 PROCEDURE — ? TREATMENT REGIMEN

## 2023-01-24 PROCEDURE — ? PRESCRIPTION MEDICATION MANAGEMENT

## 2023-01-24 PROCEDURE — 99204 OFFICE O/P NEW MOD 45 MIN: CPT

## 2023-01-24 PROCEDURE — ? COUNSELING

## 2023-01-24 PROCEDURE — ? NOTED ON EXAM BUT NOT TREATED

## 2023-01-24 ASSESSMENT — LOCATION SIMPLE DESCRIPTION DERM
LOCATION SIMPLE: RIGHT PRETIBIAL REGION
LOCATION SIMPLE: RIGHT ANTERIOR NECK
LOCATION SIMPLE: LEFT UPPER BACK
LOCATION SIMPLE: INFERIOR FOREHEAD
LOCATION SIMPLE: RIGHT SHOULDER
LOCATION SIMPLE: LOWER BACK
LOCATION SIMPLE: RIGHT UPPER BACK
LOCATION SIMPLE: LEFT LOWER BACK
LOCATION SIMPLE: POSTERIOR SCALP
LOCATION SIMPLE: ABDOMEN
LOCATION SIMPLE: LEFT PRETIBIAL REGION
LOCATION SIMPLE: RIGHT UPPER ARM
LOCATION SIMPLE: POSTERIOR NECK
LOCATION SIMPLE: RIGHT LOWER BACK

## 2023-01-24 ASSESSMENT — LOCATION ZONE DERM
LOCATION ZONE: TRUNK
LOCATION ZONE: LEG
LOCATION ZONE: FACE
LOCATION ZONE: NECK
LOCATION ZONE: SCALP
LOCATION ZONE: ARM

## 2023-01-24 ASSESSMENT — LOCATION DETAILED DESCRIPTION DERM
LOCATION DETAILED: RIGHT SUPERIOR UPPER BACK
LOCATION DETAILED: EPIGASTRIC SKIN
LOCATION DETAILED: RIGHT PROXIMAL PRETIBIAL REGION
LOCATION DETAILED: LEFT OCCIPITAL SCALP
LOCATION DETAILED: RIGHT PROXIMAL LATERAL POSTERIOR UPPER ARM
LOCATION DETAILED: LEFT MEDIAL UPPER BACK
LOCATION DETAILED: RIGHT POSTERIOR SHOULDER
LOCATION DETAILED: LEFT PROXIMAL PRETIBIAL REGION
LOCATION DETAILED: SUPERIOR LUMBAR SPINE
LOCATION DETAILED: RIGHT SUPERIOR MEDIAL LOWER BACK
LOCATION DETAILED: RIGHT CLAVICULAR NECK
LOCATION DETAILED: LEFT SUPERIOR MEDIAL LOWER BACK
LOCATION DETAILED: LEFT LATERAL TRAPEZIAL NECK
LOCATION DETAILED: RIGHT ANTERIOR SHOULDER
LOCATION DETAILED: INFERIOR MID FOREHEAD

## 2023-01-24 NOTE — PROCEDURE: MIPS QUALITY
Additional Notes: Patient will bring complete medication list next visit.
Quality 130: Documentation Of Current Medications In The Medical Record: Current Medications with Name, Dosage, Frequency, or Route not Documented, Reason not Given
Detail Level: Generalized
Quality 402: Tobacco Use And Help With Quitting Among Adolescents: Patient screened for tobacco and is an ex-smoker

## 2023-01-24 NOTE — PROCEDURE: PRESCRIPTION MEDICATION MANAGEMENT
Benign Prostatic Hyperplasia    The prostate is a small gland that makes semen. As you age, the prostate grows. If it becomes too big, it may cause problems with urination. This condition is called benign prostatic hyperplasia (BPH).  Symptoms of BPH  BPH is common in men over age 60. Thats because the prostate grows bigger during a mans life. As it grows, it presses against the urethra. The urethra carries urine out of your body from your bladder through your penis. Your bladder may also weaken as you age. It may not empty completely after you urinate.  Men with BPH may have these symptoms:  · The urge to frequently urinate, especially at night  · Leaking or dribbling of urine  · A weak stream of urine  · Not able to urinate, or having trouble starting to urinate  Diagnosing BPH  BPH can hurt your bladder and kidneys. It can also lead to bladder stones and urinary tract infections. If you think you may have BPH, talk with your healthcare provider. Early treatment can prevent problems.  Several tests can diagnose BPH. These include:  · Digital rectal exam. During this procedure, your provider puts a gloved, greased (lubricated) finger into your rectum to check the size of your prostate.  · Imaging tests. X-rays and other imaging tests can find problems in your kidneys or bladder.  · Cystoscopy. This test uses a flexible tube with a camera (scope) to look inside your urinary tract.  · Urine flow study. This test uses a special device to see how fast urine leaves your body.  Treating BPH  If you have mild symptoms, you may not need treatment. You may be able to control your BPH with lifestyle changes. Some men feel better if they limit or avoid alcohol and caffeinated drinks like coffee. Not drinking too many fluids at night can also help. Increasing your physical activity may ease symptoms, too.  Kegel exercises may also help. They strengthen the pelvic muscle to prevent urine from leaking. While urinating, 
contract your pelvic muscle to stop or slow down the flow of urine. Hold for 10 seconds. Repeat at least 5 times. Do the exercise 3 to 5 times each day.  Certain medicines can worsen BPH symptoms. These include medicines for congestion, allergies, and depression. Medicines that increase your urine flow (diuretics) can also worsen BPH symptoms. If you take any of these, talk with your provider. You may need to take another medicine or change how much you take.  BPH symptoms often get worse as the prostate grows. So at some point you may need treatment. Your provider may prescribe medicine to shrink the prostate or stop its growth. Other treatments can make the urethra wider to let urine to flow more easily. There are also some minimally invasive techniques to remove prostate tissue.  If your BPH is severe, your health care provider may recommend surgery. Surgery takes out enlarged parts of the prostate gland. Your health care provider can figure out the best option for you based on your age, overall health, and other factors.  BPH and Prostate Cancer  BPH and prostate cancer share some symptoms. Thats why its important to talk with your provider about your symptoms. Men with BPH arent more likely to develop this cancer. But they may have higher levels of the prostate-specific antigen (PSA). A higher PSA level may also be a sign of prostate cancer. Certain tests help distinguish BPH from prostate cancer. They include prostate ultrasound and biopsy.  Date Last Reviewed: 5/31/2015 © 2000-2017 [employer]. [address] [place] [address], [place] [address]. All rights reserved. This information is not intended as a substitute for professional medical care. Always follow your healthcare professional's instructions.        
Samples Given: Ultravate Lotion
Render In Strict Bullet Format?: No
Initiate Treatment: Ultravate Lotion prn for itch
Detail Level: Zone

## 2024-01-24 ENCOUNTER — APPOINTMENT (RX ONLY)
Dept: URBAN - METROPOLITAN AREA CLINIC 334 | Facility: CLINIC | Age: 64
Setting detail: DERMATOLOGY
End: 2024-01-24

## 2024-01-24 DIAGNOSIS — L81.4 OTHER MELANIN HYPERPIGMENTATION: ICD-10-CM

## 2024-01-24 DIAGNOSIS — Z12.83 ENCOUNTER FOR SCREENING FOR MALIGNANT NEOPLASM OF SKIN: ICD-10-CM

## 2024-01-24 DIAGNOSIS — L57.0 ACTINIC KERATOSIS: ICD-10-CM

## 2024-01-24 DIAGNOSIS — L82.1 OTHER SEBORRHEIC KERATOSIS: ICD-10-CM

## 2024-01-24 DIAGNOSIS — D18.0 HEMANGIOMA: ICD-10-CM

## 2024-01-24 PROBLEM — D18.01 HEMANGIOMA OF SKIN AND SUBCUTANEOUS TISSUE: Status: ACTIVE | Noted: 2024-01-24

## 2024-01-24 PROCEDURE — ? TREATMENT REGIMEN

## 2024-01-24 PROCEDURE — ? COUNSELING

## 2024-01-24 PROCEDURE — ? LIQUID NITROGEN

## 2024-01-24 PROCEDURE — 17000 DESTRUCT PREMALG LESION: CPT

## 2024-01-24 PROCEDURE — 17003 DESTRUCT PREMALG LES 2-14: CPT

## 2024-01-24 PROCEDURE — 99203 OFFICE O/P NEW LOW 30 MIN: CPT | Mod: 25

## 2024-01-24 PROCEDURE — ? FULL BODY SKIN EXAM

## 2024-01-24 ASSESSMENT — LOCATION SIMPLE DESCRIPTION DERM
LOCATION SIMPLE: ABDOMEN
LOCATION SIMPLE: RIGHT UPPER BACK
LOCATION SIMPLE: CHEST
LOCATION SIMPLE: RIGHT CHEEK
LOCATION SIMPLE: LEFT FOREHEAD

## 2024-01-24 ASSESSMENT — LOCATION DETAILED DESCRIPTION DERM
LOCATION DETAILED: LEFT INFERIOR FOREHEAD
LOCATION DETAILED: PERIUMBILICAL SKIN
LOCATION DETAILED: RIGHT SUPERIOR LATERAL MALAR CHEEK
LOCATION DETAILED: RIGHT INFERIOR MEDIAL UPPER BACK
LOCATION DETAILED: UPPER STERNUM

## 2024-01-24 ASSESSMENT — LOCATION ZONE DERM
LOCATION ZONE: TRUNK
LOCATION ZONE: FACE

## 2024-01-24 NOTE — PROCEDURE: LIQUID NITROGEN
Render Note In Bullet Format When Appropriate: No
Duration Of Freeze Thaw-Cycle (Seconds): 0
Show Applicator Variable?: Yes
Post-Care Instructions: I reviewed with the patient in detail post-care instructions. Patient is to wear sunprotection, and avoid picking at any of the treated lesions. Pt may apply Vaseline to crusted or scabbing areas.
Consent: The patient's consent was obtained including but not limited to risks of crusting, scabbing, blistering, scarring, darker or lighter pigmentary change, recurrence, incomplete removal and infection.
Application Tool (Optional): Liquid Nitrogen Sprayer
Detail Level: Detailed